# Patient Record
Sex: FEMALE | Race: WHITE | Employment: OTHER | URBAN - METROPOLITAN AREA
[De-identification: names, ages, dates, MRNs, and addresses within clinical notes are randomized per-mention and may not be internally consistent; named-entity substitution may affect disease eponyms.]

---

## 2021-08-31 ENCOUNTER — HOSPITAL ENCOUNTER (OUTPATIENT)
Dept: PHYSICAL THERAPY | Age: 76
Discharge: HOME | End: 2021-08-31
Payer: MEDICARE

## 2021-08-31 PROCEDURE — 97110 THERAPEUTIC EXERCISES: CPT

## 2021-08-31 PROCEDURE — 97161 PT EVAL LOW COMPLEX 20 MIN: CPT

## 2021-08-31 PROCEDURE — 97530 THERAPEUTIC ACTIVITIES: CPT

## 2021-08-31 NOTE — PROGRESS NOTES
PHYSICAL THERAPY - DAILY TREATMENT NOTE    Patient Name: Shonda Skelton        Date: 2021  : 1945   yes Patient  Verified  Visit #:   1   of   10  Insurance: Payor: Lindsey Johnson / Plan: [employer] / Product Type: Managed Care Medicare /      In time: 46 Out time: 4856   Total Treatment Time: 55     Medicare/BCBS Time Tracking (below)   Total Timed Codes (min):  23 1:1 Treatment Time:  55     TREATMENT AREA =  Low back pain [M54.5]    SUBJECTIVE  Pain Level (on 0 to 10 scale):  2  / 10   Medication Changes/New allergies or changes in medical history, any new surgeries or procedures?    no  If yes, update Summary List   Subjective Functional Status/Changes:  []  No changes reported   See Eval for subjective information and c/o. OBJECTIVE  8 min Therapeutic Exercise:  [x]  See flow sheet   Rationale:      increase ROM and increase strength to improve the patients ability to perform activities and decrease pain with movement. 15 min Therapeutic Activity: [x]  See flow sheet  Body part L-S  Patient was instructed in the importance of completing HEP as directed and in body mechanics and importance of following post-op lifting precautions provided by MD. Postural re-education; Education on the correlation of TA strength to L-S stability in addition to the use of logroll for bed mobility (if necessary); importance of cing 15-20min 3 x daily and avoiding bending/lifting/twisting at this time; instruction in the start of home walking program and monitoring steps/obtaining baseline     Rationale:    increase proprioception to  improve the patients ability to Tolerate basic ADLs and job-related tasks without pain.        Billed With/As:   [x] TE   [] TA   [] Neuro   [] Self Care Patient Education: [x] Review HEP    [] Progressed/Changed HEP based on:   [x] positioning   [x] body mechanics   [x] transfers   [] heat/ice application    [] other:      Other Objective/Functional Measures:    See Eval     Post Treatment Pain Level (on 0 to 10) scale:   1  / 10     ASSESSMENT  Assessment/Changes in Function:     See Eval     []  See Progress Note/Recertification   Patient will continue to benefit from skilled PT services to modify and progress therapeutic interventions to attain remaining goals. Progress toward goals / Updated goals:  Pt denied sharp pain or red flags with initial eval or therapeutic ex. See initial eval. HEP administered.       PLAN  [x]  Upgrade activities as tolerated yes Continue plan of care   []  Discharge due to :    []  Other:      Therapist: Ben Thomas PT    Date: 8/31/2021 Time: 12:46 PM     Future Appointments   Date Time Provider Marah Ackerman   9/14/2021 11:15 AM Eder Kendall [facility]   9/16/2021 11:45 AM Eder Kendall PTA [facility]   9/23/2021 10:30 AM [address] [facility] [address] 1 [facility]   9/28/2021 10:30 AM [address] [facility] [address] 1 [facility]   9/30/2021 10:30 AM [facility] PT [facility] 1 [facility]   10/5/2021 10:45 [facility]   10/7/2021 10:45 AM Tiffany Benítez, [facility] [address]

## 2021-08-31 NOTE — PROGRESS NOTES
[address] [facility] PHYSICAL THERAPY  [address] [place] [address] 51, [address] 201,[facility], [facility] - Phone: [phone]  Fax: [phone] / 9[facility]  Patient Name: Cecille Garcia : 1945   Medical   Diagnosis: Low back pain [M54.5] Treatment Diagnosis: Low back pain [M54.5]   Onset Date: 21     Referral Source: Lorrie More MD [facility]): 2021   Prior Hospitalization: See medical history Provider #: [id number]   Prior Level of Function: Indep; weightlifting; gardening; able to ambulate 3-5 miles    Comorbidities: L-S fusion; HTN; depression   Medications: Verified on Patient Summary List   The Plan of Care and following information is based on the information from the initial evaluation.   ========================================================================  Assessment / key information: Patient is a 76 y.o. female who presents to [facility] with a diagnosis of Low back pain [M54.5] s/p L-S fusion 21. Patient is her own historian. Living situation is as follows: alone in [address] [id number]. Patient presents with c/o right LE pain which radiates posterolaterally to distal to the knee, decreased mobility, decreased strength and decreased ability to sit for any length of time. Patient presents to therapy correctly donning L-S aspen brace and is knowledgeable in her post-op precautions. As per patient, she is restricted to lifting < 20lbs and dons her L-S at all times when OOB. She is icing her L-S daily and without any c/o LOB/falls. No red flags. Patient was discharged from the hospital after a 5 day stay and had 2 visits with OPPT at another location. Patient did not return to that facility and expresses interest in a comprehensive POC at this clinic to address limitations and return to PLOF.   Current Deficits include: pain, decreased mobility, decreased strength, and decreased postural awareness with resulting limitations in ADL's and in functional abilities. Patient will benefit from a comprehensive POC/HEP to address impairments and restore function in order to return to prior level of function and prevent secondary impairments. Impairments are as follows:   Pain: current pain level 2/10, pain level at worst 9/10, and pain level at best 2/10 TTP + R glute max/piriformis  Posture flattened L-S  Observations observed crossing legs while sitting and corrected with such; Incision clean, dry, intact; no visible dehiscence, erythema, or drainage noted. Gait WFLs with aspen brace  AROM/PROM (in degrees unless otherwise specified): All performed within protocol guidelines and within precautions and while sitting: L-S flexion with TA contraction and hip hinge technique 30, extension to neutral, side flexion deferred, isolated T-S rotation with blocked L-S and TA contraction 20deg   Strength B hip abd 4-/5 B hip extension deferred; all other LE myotomes grossly WNLs  Special Tests negative 90-90 test; poor ability to contract TA with manual cueing  Functional Tests 100% bridge  Functional Deficits include: decreased ability to return to gym based program with weightlifting; pain in the right LE with prolonged sitting. Patient's FOTO score was a 49/100 indicating decreased function.  Patient will benefit from a POC addressing such impairments and limitations in order to improve quality of life and return to PLOF.    ========================================================================  Eval Complexity: History: HIGH Complexity :3+ comorbidities / personal factors will impact the outcome/ POC Exam:MEDIUM Complexity : 3 Standardized tests and measures addressing body structure, function, activity limitation and / or participation in recreation  Presentation: LOW Complexity : Stable, uncomplicated  Clinical Decision Making:MEDIUM Complexity : FOTO score of 26-74Overall Complexity:LOW   Problem List: pain affecting function, decrease ROM, decrease strength, edema affecting function, impaired gait/ balance, decrease ADL/ functional abilitiies, decrease activity tolerance, decrease flexibility/ joint mobility and decrease transfer abilities   Treatment Plan may include any combination of the following: Therapeutic exercise, Therapeutic activities, Neuromuscular re-education, Physical agent/modality, Gait/balance training, Manual therapy, Aquatic therapy, Patient education, Self Care training, Functional mobility training, Home safety training and Stair training  Patient / Family readiness to learn indicated by: asking questions  Persons(s) to be included in education: patient (P)  Barriers to Learning/Limitations: None  Measures taken: A HEP was initiated to assist with POC in restoring function   Patient Goal (s): \"get back to normal and eliminate pain\"   Patient self reported health status: good  Rehabilitation Potential: excellent  Short Term Goals: To be accomplished in 4 treatments: 1. Goal: Patient will be independent and compliant with HEP in order to progress toward long term goals. Status at last note/certification: issued and reviewed  2. Goal: Patient will demonstrate the ability to contract the TA without manual cueing and maintain for 10sec hold in order to assist with L-S stabilization  Status at last note/certification: poor contraction    Long Term Goals: To be accomplished in 10 treatments: 1. Goal: Patient will improve FOTO assessment score to 57 pts in order to indicate improved functional abilities. Status at last note/certification: 49  2. Goal: Patient will report a +6 on GROC in order to aid in QOL         Status at last note/certification: NA  3. Goal: Patient will be able to demonstrate a correct hip hinge without LBP and with proper form in order to decrease pressure on the L-S with lifting and household chores         Status at last note/certification: NT  4. Goal: Patient will report being able to ambulate 3 miles without LBP in order to return to a walking program and assist with a return to function              Status at last note/certification: no baseline at this time    Frequency / Duration:     Patient to be seen  1-2  times per week for 10  treatments:  Patient / Caregiver education and instruction: exercises    Therapist Signature: Lionel Quinones PT Date: 0/09/9943   Certification Period: 90 days  8/31/21-11/28/21 Time: 12:29 PM   ========================================================================  I certify that the above Physical Therapy Services are being furnished while the patient is under my care. I agree with the treatment plan and certify that this therapy is necessary. Physician Signature:    _____  Date:     Time:______  Doris Colón MD  Please sign and return to [facility], [facility]. or you may fax the signed copy to [phone]. Thank you.

## 2021-09-14 ENCOUNTER — HOSPITAL ENCOUNTER (OUTPATIENT)
Dept: PHYSICAL THERAPY | Age: 76
Discharge: HOME | End: 2021-09-14
Payer: MEDICARE

## 2021-09-14 PROCEDURE — 97140 MANUAL THERAPY 1/> REGIONS: CPT

## 2021-09-14 PROCEDURE — 97110 THERAPEUTIC EXERCISES: CPT

## 2021-09-14 NOTE — PROGRESS NOTES
PHYSICAL THERAPY - DAILY TREATMENT NOTE    Patient Name: Michael Phillips        Date: 2021  : 1945   yes Patient  Verified  Visit #:   2   of   10  Insurance: Payor: Leticia Osei / Plan: [employer] / Product Type: Managed Care Medicare /      In time: 303 Out time:    Total Treatment Time: 55     Medicare/BCBS Time Tracking (below)   Total Timed Codes (min):  45 1:1 Treatment Time:  45     TREATMENT AREA =  Low back pain [M54.5]    SUBJECTIVE  Pain Level (on 0 to 10 scale):  2  / 10   Medication Changes/New allergies or changes in medical history, any new surgeries or procedures?    no  If yes, update Summary List   Subjective Functional Status/Changes:  []  No changes reported     Pt concerned that she is not as far along as she would like to be. States she is icing a couple of times a day. Says that she is doing longer walks (2-3 miles) once a day. Reports pain in her (R) LE when she walks - glute and shin. States the shin pain stops when she gets into a comfortable position - but never totally goes away. OBJECTIVE  Modalities Rationale:     decrease pain and increase tissue extensibility to improve patient's ability to perform pain free ADLs. min [] Estim, type/location:                                      []  att     []  unatt     []  w/US     []  w/ice    []  w/heat    min []  Mechanical Traction: type/lbs                   []  pro   []  sup   []  int   []  cont    []  before manual    []  after manual    min []  Ultrasound, settings/location:      min []  Iontophoresis w/ dexamethasone, location:                                               []  take home patch       []  in clinic   10 min []  Ice     [x]  Heat    location/position: Supine, lumbar.      min []  Vasopneumatic Device, press/temp:    If using vaso (only need to measure limb vaso being performed on)      pre-treatment girth :       post-treatment girth :       measured at (landmark location) :      min []  Other:    [x] Skin assessment post-treatment (if applicable):    [x]  intact    []  redness- no adverse reaction                  []redness  adverse reaction:      30 min Therapeutic Exercise:  [x]  See flow sheet   Rationale:      increase ROM, increase strength and improve coordination to improve the patients ability to perform pain free ADLs. 15 Min Manual Therapy: STM (B) lumbar paraspinals. Rationale:      decrease pain, increase ROM, increase tissue extensibility and decrease trigger points to improve patient's ability to perform pain free ADLs. The manual therapy interventions were performed at a separate and distinct time from the therapeutic activities interventions. Billed With/As:   [x] TE   [] TA   [] Neuro   [] Self Care Patient Education: [x] Review HEP    [] Progressed/Changed HEP based on:   [] positioning   [] body mechanics   [] transfers   [] heat/ice application    [] other:      Other Objective/Functional Measures: Therex per flow sheet. Post Treatment Pain Level (on 0 to 10) scale:   2  / 10     ASSESSMENT  Assessment/Changes in Function:     No exacerbation of symptoms with today's session. []  See Progress Note/Recertification   Patient will continue to benefit from skilled PT services to modify and progress therapeutic interventions, address functional mobility deficits, address ROM deficits, address strength deficits, analyze and address soft tissue restrictions, analyze and cue movement patterns, analyze and modify body mechanics/ergonomics and assess and modify postural abnormalities to attain remaining goals. Progress toward goals / Updated goals:    Initiated therex.       PLAN  [x]  Upgrade activities as tolerated yes Continue plan of care   []  Discharge due to :    []  Other:      Therapist: Sudie Runner, PTA    Date: 9/14/2021 Time: 11:43 AM     Future Appointments   Date Time Provider Marah Ackerman   9/16/2021 11:45 AM Unice Tryo B, PTA [facility]   9/23/2021 10:30 AM [facility] PT [facility] [address]   9/28/2021 10:30 AM [facility] PT [facility] [address]   9/30/2021 10:30 AM [facility] PT [facility] 1 [address]   10/5/2021 10:45 AM Hank Bourne [address]   10/7/2021 10:45 AM Jamee Peguero, PTA [facility]

## 2021-09-16 ENCOUNTER — HOSPITAL ENCOUNTER (OUTPATIENT)
Dept: PHYSICAL THERAPY | Age: 76
Discharge: HOME | End: 2021-09-16
Payer: MEDICARE

## 2021-09-16 PROCEDURE — 97140 MANUAL THERAPY 1/> REGIONS: CPT

## 2021-09-16 PROCEDURE — 97535 SELF CARE MNGMENT TRAINING: CPT

## 2021-09-16 PROCEDURE — 97110 THERAPEUTIC EXERCISES: CPT

## 2021-09-16 NOTE — PROGRESS NOTES
PHYSICAL THERAPY - DAILY TREATMENT NOTE    Patient Name: Paxton Esqueda        Date: 2021  : 1945   yes Patient  Verified  Visit #:   3   of   10  Insurance: Payor: Nishant Pace / Plan: [employer] / Product Type: Managed Care Medicare /      In time: 6144 Out time: 1   Total Treatment Time: 70     Medicare/BCBS Time Tracking (below)   Total Timed Codes (min):  70 1:1 Treatment Time:  60     TREATMENT AREA =  Low back pain [M54.5]    SUBJECTIVE  Pain Level (on 0 to 10 scale):  1  / 10   Medication Changes/New allergies or changes in medical history, any new surgeries or procedures?    no  If yes, update Summary List   Subjective Functional Status/Changes:  []  No changes reported     I was sore after the last visit, but I haven't had the hip pain since that visit. OBJECTIVE  40 min Therapeutic Exercise:  [x]  See flow sheet   Rationale:      increase ROM, increase strength and improve coordination to improve the patients ability to perform pain free ADLs. 20 min Manual Therapy: TPR (R) glute/piriformis. Rationale:      decrease pain, increase ROM, increase tissue extensibility and decrease trigger points to improve patient's ability to perform pain free ADLs. The manual therapy interventions were performed at a separate and distinct time from the therapeutic activities interventions. 10 Min Self Care: HEP review. Rationale:    increase ROM, increase strength, improve coordination and improve balance to improve the patients ability to perform pain free ADLs. Billed With/As:   [x] TE   [] TA   [] Neuro   [] Self Care Patient Education: [x] Review HEP    [] Progressed/Changed HEP based on:   [] positioning   [] body mechanics   [] transfers   [] heat/ice application    [] other:      Other Objective/Functional Measures: Therex per flow sheet.       Post Treatment Pain Level (on 0 to 10) scale:   1  / 10     ASSESSMENT  Assessment/Changes in Function: TTP (R) glute/piriformis. Demonstrating compliance with HEP. Pt has reduced the amount of time she spends walking daily at PTA request.      []  See Progress Note/Recertification   Patient will continue to benefit from skilled PT services to modify and progress therapeutic interventions, address functional mobility deficits, address ROM deficits, address strength deficits, analyze and address soft tissue restrictions, analyze and cue movement patterns, analyze and modify body mechanics/ergonomics and assess and modify postural abnormalities to attain remaining goals. Progress toward goals / Updated goals:    Perform pain free ADLs.        PLAN  [x]  Upgrade activities as tolerated yes Continue plan of care   []  Discharge due to :    []  Other:      Therapist: Africa Pichardo PTA    Date: 9/16/2021 Time: 3:12 PM     Future Appointments   Date Time Provider Marah Ackerman   9/21/2021 10:30 AM [address] [facility]   9/23/2021 10:30 AM [address]   9/28/2021 10:30 AM [address]   9/30/2021 10:30 AM [facility] PT [facility] [address]   10/5/2021 10:45 AM Rebecca Crook PTA [address]   10/7/2021 10:45 AM Saud Gallegos, PTA [facility]

## 2021-09-21 ENCOUNTER — APPOINTMENT (OUTPATIENT)
Dept: PHYSICAL THERAPY | Age: 76
End: 2021-09-21
Payer: MEDICARE

## 2021-09-22 ENCOUNTER — HOSPITAL ENCOUNTER (OUTPATIENT)
Dept: PHYSICAL THERAPY | Age: 76
Discharge: HOME | End: 2021-09-22
Payer: MEDICARE

## 2021-09-22 PROCEDURE — 97535 SELF CARE MNGMENT TRAINING: CPT

## 2021-09-22 PROCEDURE — 97140 MANUAL THERAPY 1/> REGIONS: CPT

## 2021-09-22 PROCEDURE — 97110 THERAPEUTIC EXERCISES: CPT

## 2021-09-22 NOTE — PROGRESS NOTES
PHYSICAL THERAPY - DAILY TREATMENT NOTE    Patient Name: Brit Cost        Date: 2021  : 1945   yes Patient  Verified  Visit #:      10  Insurance: Payor: Jasson Conrad / Plan: [employer] / Product Type: Managed Care Medicare /      In time: 130 Out time: 225   Total Treatment Time: 55     Medicare/BCBS Time Tracking (below)   Total Timed Codes (min):  55 1:1 Treatment Time:  55     TREATMENT AREA =  Low back pain [M54.5]    SUBJECTIVE  Pain Level (on 0 to 10 scale):  1  / 10   Medication Changes/New allergies or changes in medical history, any new surgeries or procedures?    no  If yes, update Summary List   Subjective Functional Status/Changes:  []  No changes reported     I haven't walked much today, but I'm not having anything (pain) in the legs today. OBJECTIVE  30 min Therapeutic Exercise:  [x]  See flow sheet   Rationale:      increase ROM, increase strength and improve coordination to improve the patients ability to perform pain free ADLs. 15 min Manual Therapy: TPR (R) glute/piriformis. Rationale:      decrease pain, increase ROM, increase tissue extensibility and decrease trigger points to improve patient's ability to perform pain free ADLs. The manual therapy interventions were performed at a separate and distinct time from the therapeutic activities interventions. 10 Min Self Care: HEP review. Rationale:    increase ROM, increase strength, improve coordination and improve balance to improve the patients ability to perform pain free ADLs. Billed With/As:   [x] TE   [] TA   [] Neuro   [] Self Care Patient Education: [x] Review HEP    [] Progressed/Changed HEP based on:   [] positioning   [] body mechanics   [] transfers   [] heat/ice application    [] other:      Other Objective/Functional Measures: Therex per flow sheet.       Post Treatment Pain Level (on 0 to 10) scale:   1  / 10     ASSESSMENT  Assessment/Changes in Function: No exacerbation of symptoms with today's session. []  See Progress Note/Recertification   Patient will continue to benefit from skilled PT services to modify and progress therapeutic interventions, address functional mobility deficits, address ROM deficits, address strength deficits, analyze and address soft tissue restrictions, analyze and cue movement patterns, analyze and modify body mechanics/ergonomics and assess and modify postural abnormalities to attain remaining goals. Progress toward goals / Updated goals:    No change in progress toward LTG's with today's session.       PLAN  []  Upgrade activities as tolerated yes Continue plan of care   []  Discharge due to :    []  Other:      Therapist: Imtiaz Koroma PTA    Date: 9/22/2021 Time: 1:36 PM     Future Appointments   Date Time Provider Marah Ackerman   9/24/2021  2:15 PM Chestine Running [address] [facility]   9/28/2021 12:00 PM Chestine Running [address] [facility]   9/30/2021 12:30 PM Chestine Running [address] [facility]   10/5/2021  1:15 PM Chestine Running [address] [facility]   10/7/2021 12:30 PM Roberta Vines PTA [address]

## 2021-09-23 ENCOUNTER — APPOINTMENT (OUTPATIENT)
Dept: PHYSICAL THERAPY | Age: 76
End: 2021-09-23
Payer: MEDICARE

## 2021-09-24 ENCOUNTER — HOSPITAL ENCOUNTER (OUTPATIENT)
Dept: PHYSICAL THERAPY | Age: 76
Discharge: HOME | End: 2021-09-24
Payer: MEDICARE

## 2021-09-24 PROCEDURE — 97110 THERAPEUTIC EXERCISES: CPT

## 2021-09-24 PROCEDURE — 97140 MANUAL THERAPY 1/> REGIONS: CPT

## 2021-09-28 ENCOUNTER — APPOINTMENT (OUTPATIENT)
Dept: PHYSICAL THERAPY | Age: 76
End: 2021-09-28
Payer: MEDICARE

## 2021-09-30 ENCOUNTER — APPOINTMENT (OUTPATIENT)
Dept: PHYSICAL THERAPY | Age: 76
End: 2021-09-30
Payer: MEDICARE

## 2021-09-30 ENCOUNTER — HOSPITAL ENCOUNTER (OUTPATIENT)
Dept: PHYSICAL THERAPY | Age: 76
Discharge: HOME | End: 2021-09-30
Payer: MEDICARE

## 2021-09-30 PROCEDURE — 97110 THERAPEUTIC EXERCISES: CPT

## 2021-09-30 PROCEDURE — 97140 MANUAL THERAPY 1/> REGIONS: CPT

## 2021-09-30 NOTE — PROGRESS NOTES
PHYSICAL THERAPY - DAILY TREATMENT NOTE    Patient Name: Sam Gaxiola        Date: 2021  : 1945   yes Patient  Verified  Visit #:      of   10  Insurance: Payor: Fabrizio Sawant / Plan: [employer] / Product Type: Managed Care Medicare /      In time: 8427 Out time: 130   Total Treatment Time: 60     Medicare/[facility] Time Tracking (below)   Total Timed Codes (min):  60 1:1 Treatment Time:  60     TREATMENT AREA =  Low back pain [M54.5]    SUBJECTIVE  Pain Level (on 0 to 10 scale):  1  / 10   Medication Changes/New allergies or changes in medical history, any new surgeries or procedures?    no  If yes, update Summary List   Subjective Functional Status/Changes:  []  No changes reported     No new complaints. OBJECTIVE  30 min Therapeutic Exercise:  [x]  See flow sheet   Rationale:      increase ROM, increase strength, improve coordination and improve balance to improve the patients ability to perform pain free ADLs. 30 Min Manual Therapy: STM/DTM (B) lumbar paraspinals. Rationale:      decrease pain, increase ROM, increase tissue extensibility and decrease trigger points to improve patient's ability to perform pain free ADLs. The manual therapy interventions were performed at a separate and distinct time from the therapeutic activities interventions. Billed With/As:   [x] TE   [] TA   [] Neuro   [] Self Care Patient Education: [x] Review HEP    [] Progressed/Changed HEP based on:   [] positioning   [] body mechanics   [] transfers   [] heat/ice application    [] other:      Other Objective/Functional Measures: Therex per flow sheet. Post Treatment Pain Level (on 0 to 10) scale:   0  / 10     ASSESSMENT  Assessment/Changes in Function:     No exacerbation of symptoms with today's session.       []  See Progress Note/Recertification   Patient will continue to benefit from skilled PT services to modify and progress therapeutic interventions, address functional mobility deficits, address ROM deficits, address strength deficits, analyze and address soft tissue restrictions, analyze and cue movement patterns, analyze and modify body mechanics/ergonomics and assess and modify postural abnormalities to attain remaining goals. Progress toward goals / Updated goals:    No change in progress toward LTG's with today's session.       PLAN  [x]  Upgrade activities as tolerated yes Continue plan of care   []  Discharge due to :    []  Other:      Therapist: George Mike PTA    Date: 9/30/2021 Time: 12:32 PM     Future Appointments   Date Time Provider Marah Ackerman   10/5/2021  1:15 PM [address]   10/7/2021 12:30 PM MERE [address]

## 2021-09-30 NOTE — PROGRESS NOTES
PHYSICAL THERAPY - DAILY TREATMENT NOTE    Patient Name: John Myrick        Date: 2021  : 1945   yes Patient  Verified  Visit #:      10  Insurance: Payor: Lexa Hidden / [facility] / Product Type: Managed Care Medicare /      In time: 130 Out time: 235   Total Treatment Time: 65     Medicare/BCBS Time Tracking (below)   Total Timed Codes (min):  65 1:1 Treatment Time:  65     TREATMENT AREA =  Low back pain [M54.5]    SUBJECTIVE  Pain Level (on 0 to 10 scale):  3  / 10   Medication Changes/New allergies or changes in medical history, any new surgeries or procedures?    no  If yes, update Summary List   Subjective Functional Status/Changes:  []  No changes reported     I've been a little sore lately. The walking is going okay. OBJECTIVE  40 min Therapeutic Exercise:  [x]  See flow sheet   Rationale:      increase ROM, increase strength, improve coordination and improve balance to improve the patients ability to perform pain free ADLs. 20 Min Manual Therapy: TPR (R) glute/piriformis. Rationale:      decrease pain, increase ROM, increase tissue extensibility and decrease trigger points to improve patient's ability to perform pain free ADLs. The manual therapy interventions were performed at a separate and distinct time from the therapeutic activities interventions. Billed With/As:   [x] TE   [] TA   [] Neuro   [] Self Care Patient Education: [x] Review HEP    [] Progressed/Changed HEP based on:   [] positioning   [] body mechanics   [] transfers   [] heat/ice application    [] other:      Other Objective/Functional Measures: Therex per flow sheet. Post Treatment Pain Level (on 0 to 10) scale:   1  / 10     ASSESSMENT  Assessment/Changes in Function:     No exacerbation of symptoms with today's session.       []  See Progress Note/Recertification   Patient will continue to benefit from skilled PT services to modify and progress therapeutic interventions, address functional mobility deficits, address ROM deficits, address strength deficits, analyze and address soft tissue restrictions, analyze and cue movement patterns, analyze and modify body mechanics/ergonomics and assess and modify postural abnormalities to attain remaining goals. Progress toward goals / Updated goals:    No change in progress toward LTG's with today's session.       PLAN  [x]  Upgrade activities as tolerated yes Continue plan of care   []  Discharge due to :    []  Other:      Therapist: Arianna Simms PTA    Date: 9/30/2021 Time: 12:33 PM     Future Appointments   Date Time Provider Marah Ackerman   10/5/2021  1:15 PM Thomas [facility]   10/7/2021 12:30 PM [facility]

## 2021-10-05 ENCOUNTER — HOSPITAL ENCOUNTER (OUTPATIENT)
Dept: PHYSICAL THERAPY | Age: 76
Discharge: HOME | End: 2021-10-05
Payer: MEDICARE

## 2021-10-05 ENCOUNTER — APPOINTMENT (OUTPATIENT)
Dept: PHYSICAL THERAPY | Age: 76
End: 2021-10-05
Payer: MEDICARE

## 2021-10-05 PROCEDURE — 97140 MANUAL THERAPY 1/> REGIONS: CPT

## 2021-10-05 PROCEDURE — 97110 THERAPEUTIC EXERCISES: CPT

## 2021-10-05 NOTE — PROGRESS NOTES
PHYSICAL THERAPY - DAILY TREATMENT NOTE    Patient Name: Tiny Hill        Date: 10/5/2021  : 1945   yes Patient  Verified  Visit #:   7   of   10  Insurance: Payor: Shira Marsjose / Plan: [employer] / Product Type: Managed Care Medicare /      In time: 130 Out time: ***   Total Treatment Time: ***     Medicare/BCBS Time Tracking (below)   Total Timed Codes (min):  *** 1:1 Treatment Time:  ***     TREATMENT AREA =  Low back pain [M54.50]    SUBJECTIVE  Pain Level (on 0 to 10 scale):  3  / 10   Medication Changes/New allergies or changes in medical history, any new surgeries or procedures?    no  If yes, update Summary List   Subjective Functional Status/Changes:  []  No changes reported     Pt reporting continued pain in the (R) sided glute and side of the hip. Pt has not noticed pain in the side of the (R) lower leg lately. Pt reporting recent max pain of 6/10, 25% improvement in symptoms since beginning PT. Pt states she is walking about 2 miles per day.          OBJECTIVE  Modalities Rationale:     {[facility] INMOTION MODALITIES:02006} to improve patient's ability to ***   min [] Estim, type/location:                                      []  att     []  unatt     []  w/US     []  w/ice    []  w/heat    min []  Mechanical Traction: type/lbs                   []  pro   []  sup   []  int   []  cont    []  before manual    []  after manual    min []  Ultrasound, settings/location:      min []  Iontophoresis w/ dexamethasone, location:                                               []  take home patch       []  in clinic    min []  Ice     []  Heat    location/position:     min []  Vasopneumatic Device, press/temp:    If using vaso (only need to measure limb vaso being performed on)      pre-treatment girth :       post-treatment girth :       measured at (landmark location) :      min []  Other:    [] Skin assessment post-treatment (if applicable):    []  intact    []  redness- no adverse reaction                  []redness  adverse reaction:      *** min Therapeutic Exercise:  [x]  See flow sheet   Rationale:      increase ROM, increase strength, improve coordination and improve balance to improve the patients ability to perform pain free ADLs. *** Min Manual Therapy: STM/DTM (B) lumbar paraspinals. Rationale:      decrease pain, increase ROM, increase tissue extensibility and decrease trigger points to improve patient's ability to perform pain free ADLs. The manual therapy interventions were performed at a separate and distinct time from the therapeutic activities interventions. Billed With/As:   [x] TE   [] TA   [] Neuro   [] Self Care Patient Education: [x] Review HEP    [] Progressed/Changed HEP based on:   [] positioning   [] body mechanics   [] transfers   [] heat/ice application    [] other:      Other Objective/Functional Measures: 1. Goal: Patient will improve FOTO assessment score to 57 pts in order to indicate improved functional abilities. Status at last note/certification: 49  2. Goal: Patient will report a +6 on GROC in order to aid in QOL         Status at last note/certification: NA  3. Kirsty Nails will be able to demonstrate a correct hip hinge without LBP and with proper form in order to decrease pressure on the L-S with lifting and household chores         Status at last note/certification: NT  4. Kirsty Nails will report being able to ambulate 3 miles without LBP in order to return to a walking program and assist with a return to function              Status at last note/certification: no baseline at this time    ***     Post Treatment Pain Level (on 0 to 10) scale:   ***  / 10     ASSESSMENT  Assessment/Changes in Function:     ***     []  See Progress Note/Recertification   Patient will continue to benefit from skilled PT services to modify and progress therapeutic interventions, address functional mobility deficits, address ROM deficits, address strength deficits, analyze and address soft tissue restrictions, analyze and cue movement patterns, analyze and modify body mechanics/ergonomics and assess and modify postural abnormalities to attain remaining goals.    Progress toward goals / Updated goals:    ***     PLAN  [x]  Upgrade activities as tolerated yes Continue plan of care   []  Discharge due to :    []  Other:      Therapist: Elo Garcia PTA    Date: 10/5/2021 Time: 1:37 PM     Future Appointments   Date Time Provider Marah Ackerman   10/7/2021 12:30 PM Katharine Pickard [id number]

## 2021-10-07 ENCOUNTER — APPOINTMENT (OUTPATIENT)
Dept: PHYSICAL THERAPY | Age: 76
End: 2021-10-07
Payer: MEDICARE

## 2021-10-12 ENCOUNTER — APPOINTMENT (OUTPATIENT)
Dept: PHYSICAL THERAPY | Age: 76
End: 2021-10-12
Payer: MEDICARE

## 2021-10-15 ENCOUNTER — HOSPITAL ENCOUNTER (OUTPATIENT)
Dept: PHYSICAL THERAPY | Age: 76
End: 2021-10-15
Payer: MEDICARE

## 2021-10-19 ENCOUNTER — APPOINTMENT (OUTPATIENT)
Dept: PHYSICAL THERAPY | Age: 76
End: 2021-10-19
Payer: MEDICARE

## 2021-10-20 NOTE — PROGRESS NOTES
PHYSICAL THERAPY - DAILY TREATMENT NOTE    Patient Name: Claudene Boots        Date: 10/20/2021  : 1945   yes Patient  Verified  Visit #:   7   of   10  Insurance: Payor: Patt Oscar / Plan: [employer] / Product Type: Managed Care Medicare /      In time: 1:30 Out time: 2:40   Total Treatment Time: 70     Medicare/[facility] Time Tracking (below)   Total Timed Codes (min):  NA 1:1 Treatment Time:  NA       ** Unable to properly document on this date of service, treating PTA did not complete note and has since left the company, therefore will not bill for this session **    TREATMENT AREA =  Other low back pain [M54.59]    SUBJECTIVE  Pain Level (on 0 to 10 scale):  3  / 10   Medication Changes/New allergies or changes in medical history, any new surgeries or procedures?    no  If yes, update Summary List   Subjective Functional Status/Changes:  []  No changes reported     Pt reporting continued pain in the (R) sided glute and side of the hip. Pt has not noticed pain in the side of the (R) lower leg lately. Pt reporting recent max pain of 6/10, 25% improvement in symptoms since beginning PT. Pt states she is walking about 2 miles per day.             OBJECTIVE      NA min Therapeutic Exercise:  [x]  See flow sheet       NA min Therapeutic Activity: [x]  See flow sheet       NA min Neuromuscular Re-ed: [x]  See flow sheet         NA min Self Care:        Billed With/As:   [] TE   [] TA   [] Neuro   [] Self Care Patient Education: [x] Review HEP    [] Progressed/Changed HEP based on:   [] positioning   [] body mechanics   [] transfers   [] heat/ice application    [] other:      PLAN  []  Upgrade activities as tolerated yes Continue plan of care   []  Discharge due to :    []  Other:      ** Unable to properly document on this date of service, treating PTA did not complete note and has since left the company, therefore will not bill for this session **      Therapist: ** Ravi Lan PT, DPT, OCS, CSCS; completing note for Reyes Flnikkie SEVERINO, who left company unexpectedly **    Date: 10/20/2021 Time: 9:39 AM     Future Appointments   Date Time Provider Marah Ackerman   11/3/2021 11:30 AM Viridiana Landrum [facility]   11/10/2021 11:30 AM Juanis Yarbrough PTA [facility]   11/17/2021 11:30 AM Juanis Yarbrough PTA [facility]   11/22/2021 11:30 AM Mundo Dietz PTA [facility] [address]

## 2021-10-21 ENCOUNTER — APPOINTMENT (OUTPATIENT)
Dept: PHYSICAL THERAPY | Age: 76
End: 2021-10-21
Payer: MEDICARE

## 2021-10-25 ENCOUNTER — HOSPITAL ENCOUNTER (OUTPATIENT)
Dept: PHYSICAL THERAPY | Age: 76
Discharge: HOME | End: 2021-10-25
Payer: MEDICARE

## 2021-10-25 PROCEDURE — 97110 THERAPEUTIC EXERCISES: CPT

## 2021-10-25 PROCEDURE — 97535 SELF CARE MNGMENT TRAINING: CPT

## 2021-10-25 NOTE — PROGRESS NOTES
PHYSICAL THERAPY - DAILY TREATMENT NOTE    Patient Name: Flynn Guzmán        Date: 10/25/2021  : 1945   yes Patient  Verified  Visit #:   8   of   10  Insurance: Payor: Holland Rodriguez / Plan: [employer] / Product Type: Managed Care Medicare /      In time: 9209 am Out time: 4563 am   Total Treatment Time: 38     Medicare/BCBS Time Tracking (below)   Total Timed Codes (min):  38 1:1 Treatment Time:  38     TREATMENT AREA =  Other low back pain [M54.59]    SUBJECTIVE  Pain Level (on 0 to 10 scale):  3 / 10   Medication Changes/New allergies or changes in medical history, any new surgeries or procedures?    no  If yes, update Summary List   Subjective Functional Status/Changes:  []  No changes reported     See Progress Note       OBJECTIVE  15 min Therapeutic Exercise:  [x]  See flow sheet   Rationale:      increase ROM and increase strength to improve the patient's ability to perform functional tasks and ADLs     23 min Self Care: See pt education   Rationale:    Pt education to improve the patient's ability to adhere to PT POC    Billed With/As:  [] TE  [] TA  [] Neuro  [x] Self Care Patient Education: [x] Review HEP    [] Progressed/Changed HEP based on:   [] positioning   [] body mechanics   [] transfers   [] heat/ice application    [x] other: PT progress, POC     Other Objective/Functional Measures:    See Progress Note     Post Treatment Pain Level (on 0 to 10) scale:   2-3  / 10     ASSESSMENT  Assessment/Changes in Function:     See Progress Note     [x]  See Progress Note/Recertification   Patient will continue to benefit from skilled PT services to modify and progress therapeutic interventions, address functional mobility deficits, address ROM deficits, address strength deficits, analyze and address soft tissue restrictions, analyze and cue movement patterns, analyze and modify body mechanics/ergonomics, assess and modify postural abnormalities, address imbalance/dizziness and instruct in home and community integration to attain remaining goals.    Progress toward goals / Updated goals:    See Progress Note     PLAN  [x]  Upgrade activities as tolerated yes Continue plan of care   []  Discharge due to :    []  Other:      Therapist: Rachid Hastings, PT    Date: 10/25/2021 Time: 10:30 AM     Future Appointments   Date Time Provider Marah Ackerman   11/3/2021 11:30 AM Oc Allen, [facility] [address]   11/10/2021 11:30 AM Oc Allen PTA [facility]   11/17/2021 11:30 AM Oc Allen, PTA [facility] [address]   11/22/2021 11:30 AM Oc Allen, PTA [facility] [address]

## 2021-10-25 NOTE — PROGRESS NOTES
[facility] PHYSICAL THERAPY   [facility] [address],[place] Heard, [address] \[facility] - Phone: [phone]  Fax: [phone] THERAPY          Patient Name: Mehnaz Zarate : 1945   Treatment/Medical Diagnosis: Other low back pain [M54.59]   Onset Date: 21    Referral Source: Ramiro Shea MD [facility] [place]): 21   Prior Hospitalization: See Medical History Provider #: [id number]   Prior Level of Function: Indep; weightlifting; gardening; able to ambulate 3-5 miles   Comorbidities: L-S fusion; HTN; depression   Medications: Verified on Patient Summary List   Visits from [facility]: 8 Missed Visits: 1     SUMMARY OF TREATMENT  Skilled PT services have consisted of: ther ex, manual therapy, pt education, modalities PRN, HEP  CURRENT STATUS  Patient has made good progress with PT interventions s/p lumbar fusion 21. Patient reports ~50% overall improvement since beginning PT. In the last 2 weeks, pain has ranged between 2-6/10. Notices most improvement with adherence to post-op precautions and home exercises. She has been able to resume light gardening with brace donned. Significant functional improvement denoted with 7 point increase on FOTO and +4 on GROC. Goal/Measure of Progress Goal Met? 1. Patient will be independent and compliant with HEP in order to progress toward long term goals. Status at Last Eval: New goal Current Status: Pt reports performing 3x/weekly Progressing   2. Patient will demonstrate the ability to contract the TA without manual cueing and maintain for 10sec hold in order to assist with L-S stabilization. Status at Last Eval: Poor contraction noted at IE Current Status: Pt able to contract the TA without cueing while supine for approx 5 seconds Progressing   3. Patient will improve FOTO assessment score to 57 pts in order to indicate improved functional abilities.    Status at Last Eval: 49 Current Status: 56 Progressing   4. Patient will report a +6 on GROC in order to aid in QOL. Status at Last Eval: New goal Current Status: +4 Progressing   5. Patient will be able to demonstrate a correct hip hinge without LBP and with proper form in order to decrease pressure on the L-S with lifting and household chores. Status at Last Eval: Able to perform with verbal cue for TA contraction to 30 deg lumbar flex Current Status: Able to perform with verbal cue for TA contraction to 30 deg lumbar flex no   6. Patient will report being able to ambulate 3 miles without LBP in order to return to a walking program and assist with a return to function. Status at Last Eval: Pt reports walking 1/2 block Current Status: Pt reports walking a full block Progressing     Key Functional Changes/Progress: See above  Problem List: pain affecting function, decrease ROM, decrease strength, edema affecting function, impaired gait/ balance, decrease ADL/ functional abilitiies, decrease activity tolerance, decrease flexibility/ joint mobility and decrease transfer abilities   Treatment Plan may include any combination of the following: Therapeutic exercise, Therapeutic activities, Neuromuscular re-education, Physical agent/modality, Gait/balance training, Manual therapy, Patient education, Self Care training, Functional mobility training, Home safety training and Stair training  Patient Goal(s) has/have been updated and include(s):      Goals for this certification period include and are to be achieved in  4  weeks:  1) Patient will be independent and compliant with HEP in order to progress toward long term goals. 2) Patient will demonstrate the ability to contract the TA without manual cueing and maintain for 10sec hold in order to assist with L-S stabilization. 3) Patient will improve FOTO assessment score to 57 pts in order to indicate improved functional abilities.   4) Patient will report a +6 on GROC in order to aid in QOL. 5) Patient will be able to demonstrate a correct hip hinge without LBP and with proper form in order to decrease pressure on the L-S with lifting and household chores  6) Patient will report being able to ambulate 3 miles without LBP in order to return to a walking program and assist with a return to function. Frequency / Duration:   Patient to be seen   1-2   times per week for   4    weeks:  Assessments/Recommendations: Continue seeing pt 1-2x/week x4 more weeks. If you have any questions/comments please contact us directly at [phone]. Thank you for allowing us to assist in the care of your patient. Therapist Signature: Parvez Juarez PT Date: 82/66/8071   Certification Period:  Reporting Period: 10/25/21 to 1/23/22 8/31/21 to 10/25/2021 Time: 10:30 AM   NOTE TO PHYSICIAN:  PLEASE COMPLETE THE ORDERS BELOW AND FAX TO   [facility]: [phone]  If you are unable to process this request in 24 hours please contact our office: [phone]    ___ I have read the above report and request that my patient continue as recommended.   ___ I have read the above report and request that my patient continue therapy with the following changes/special instructions: ________________________________________________   ___ I have read the above report and request that my patient be discharged from therapy. I certify that the above Physical Therapy Services are being furnished while the patient is under my care. I agree with the treatment plan and certify that this therapy is necessary.     Physician Signature:        Date:       Time:                                        Jacqueline Downing MD

## 2021-10-26 ENCOUNTER — APPOINTMENT (OUTPATIENT)
Dept: PHYSICAL THERAPY | Age: 76
End: 2021-10-26
Payer: MEDICARE

## 2021-10-26 ENCOUNTER — HOSPITAL ENCOUNTER (OUTPATIENT)
Dept: PHYSICAL THERAPY | Age: 76
End: 2021-10-26

## 2021-11-03 ENCOUNTER — APPOINTMENT (OUTPATIENT)
Dept: PHYSICAL THERAPY | Age: 76
End: 2021-11-03
Payer: MEDICARE

## 2021-11-05 ENCOUNTER — HOSPITAL ENCOUNTER (OUTPATIENT)
Dept: PHYSICAL THERAPY | Age: 76
Discharge: HOME | End: 2021-11-05
Payer: MEDICARE

## 2021-11-05 PROCEDURE — 97014 ELECTRIC STIMULATION THERAPY: CPT

## 2021-11-05 PROCEDURE — 97110 THERAPEUTIC EXERCISES: CPT

## 2021-11-05 PROCEDURE — 97140 MANUAL THERAPY 1/> REGIONS: CPT

## 2021-11-05 NOTE — PROGRESS NOTES
PHYSICAL THERAPY - DAILY TREATMENT NOTE    Patient Name: Jalen Hinkle        Date: 2021  : 1945   yes Patient  Verified  Visit #:     Insurance: Payor: Meryl Gonzales / Plan: [employer] / Product Type: Managed Care Medicare /      In time: 250 Out time: 1055   Total Treatment Time: 60     Medicare/BCBS Time Tracking (below)   Total Timed Codes (min):  60 1:1 Treatment Time:  60     TREATMENT AREA =  Other low back pain [M54.59]    SUBJECTIVE  Pain Level (on 0 to 10 scale):  2  / 10   Medication Changes/New allergies or changes in medical history, any new surgeries or procedures?    no  If yes, update Summary List   Subjective Functional Status/Changes:  []  No changes reported     I left my exercise at my sons. I had some problems and pain with walking on vacation but pushed through it. OBJECTIVE  Modalities Rationale:     decrease inflammation and decrease pain to improve patient's ability to perform functional mobility activities and general ADLs with decrease c/o symptoms.   10 min [x] Estim, type/location: (R) SIJ and glute premod                                     []  att     [x]  unatt     []  w/US     [x]  w/ice    []  w/heat    min []  Mechanical Traction: type/lbs                   []  pro   []  sup   []  int   []  cont    []  before manual    []  after manual    min []  Ultrasound, settings/location:      min []  Iontophoresis w/ dexamethasone, location:                                               []  take home patch       []  in clinic    min []  Ice     []  Heat    location/position:     min []  Vasopneumatic Device, press/temp:    If using vaso (only need to measure limb vaso being performed on)      pre-treatment girth :       post-treatment girth :       measured at (landmark location) :      min []  Other:    [x] Skin assessment post-treatment (if applicable):    [x]  intact    []  redness- no adverse reaction                  []redness  adverse reaction:      25 min Therapeutic Exercise:  [x]  See flow sheet   Rationale:      increase ROM, increase strength, improve coordination and improve balance to improve the patients ability to perform functional mobility activities and general ADLs with decrease c/o symptoms. 25 min Manual Therapy: Prone with 2 pillows: DTM L/S and lower T/s paraspinals, DTM (B) SIJ sulcus, (R) upper glute and piriformis, ER ROM/Stretch. Rationale:      decrease pain, increase ROM and increase tissue extensibility to improve patient's ability to perform functional mobility activities and general ADLs with decrease c/o symptoms. The manual therapy interventions were performed at a separate and distinct time from the therapeutic activities interventions. Billed With/As:   [x] TE   [] TA   [] Neuro   [] Self Care Patient Education: [x] Review HEP    [] Progressed/Changed HEP based on:   [] positioning   [] body mechanics   [] transfers   [] heat/ice application    [] other:      Other Objective/Functional Measures:    No change in functional measurements noted toady     Post Treatment Pain Level (on 0 to 10) scale:   2  / 10     ASSESSMENT  Assessment/Changes in Function:     vcing to perform all therx with good form and body mechanics. []  See Progress Note/Recertification   Patient will continue to benefit from skilled PT services to modify and progress therapeutic interventions, address functional mobility deficits, address ROM deficits, address strength deficits, analyze and address soft tissue restrictions and analyze and cue movement patterns to attain remaining goals. Progress toward goals / Updated goals:    · Goals for this certification period include and are to be achieved in  4  weeks:  1) Patient will be independent and compliant with HEP in order to progress toward long term goals.   2) Patient will demonstrate the ability to contract the TA without manual cueing and maintain for 10sec hold in order to assist with L-S stabilization. 3) Patient will improve FOTO assessment score to 57 pts in order to indicate improved functional abilities. 4) Patient will report a +6 on GROC in order to aid in QOL. 5) Patient will be able to demonstrate a correct hip hinge without LBP and with proper form in order to decrease pressure on the L-S with lifting and household chores  6) Patient will report being able to ambulate 3 miles without LBP in order to return to a walking program and assist with a return to function. No change toward new goals today.      PLAN  []  Upgrade activities as tolerated yes Continue plan of care   []  Discharge due to :    []  Other:      Therapist: Lin La PTA    Date: 11/5/2021 Time: 6:30 AM     Future Appointments   Date Time Provider Marah Ackerman   11/5/2021 10:00 AM [facility]   11/10/2021 11:30 AM [facility]   11/17/2021 11:30 AM [facility]   11/22/2021 11:30 AM [facility]

## 2021-11-10 ENCOUNTER — HOSPITAL ENCOUNTER (OUTPATIENT)
Dept: PHYSICAL THERAPY | Age: 76
Discharge: HOME | End: 2021-11-10
Payer: MEDICARE

## 2021-11-10 PROCEDURE — 97140 MANUAL THERAPY 1/> REGIONS: CPT

## 2021-11-10 PROCEDURE — 97014 ELECTRIC STIMULATION THERAPY: CPT

## 2021-11-10 PROCEDURE — 97110 THERAPEUTIC EXERCISES: CPT

## 2021-11-10 NOTE — PROGRESS NOTES
PHYSICAL THERAPY - DAILY TREATMENT NOTE    Patient Name: Darryle Lint        Date: 11/10/2021  : 1945   yes Patient  Verified  Visit #:   10   of   18  Insurance: Payor: Alayna Jefferson / Plan: [employer] / Product Type: Managed Care Medicare /      In time:  Out time: 115   Total Treatment Time: 50     Medicare/BCBS Time Tracking (below)   Total Timed Codes (min):  50 1:1 Treatment Time:  50     TREATMENT AREA =  Other low back pain [M54.59]    SUBJECTIVE  Pain Level (on 0 to 10 scale):  3  / 10   Medication Changes/New allergies or changes in medical history, any new surgeries or procedures?    no  If yes, update Summary List   Subjective Functional Status/Changes:  []  No changes reported     I felt really good after last session and much more relaxed in my back. Walking is my worse though. OBJECTIVE  Modalities Rationale:     decrease inflammation and decrease pain to improve patient's ability to perform functional mobility activities and general ADLs with decrease c/o symptoms.    10 min [x] Estim, type/location: (R) SIJ and glute premod                                         []  att     [x]  unatt     []  w/US     [x]  w/ice    []  w/heat    min []  Mechanical Traction: type/lbs                   []  pro   []  sup   []  int   []  cont    []  before manual    []  after manual    min []  Ultrasound, settings/location:      min []  Iontophoresis w/ dexamethasone, location:                                               []  take home patch       []  in clinic    min []  Ice     []  Heat    location/position:     min []  Vasopneumatic Device, press/temp:    If using vaso (only need to measure limb vaso being performed on)      pre-treatment girth :       post-treatment girth :       measured at (landmark location) :      min []  Other:    [x] Skin assessment post-treatment (if applicable):    [x]  intact    []  redness- no adverse reaction                  []redness  adverse reaction:      25 min Therapeutic Exercise:  [x]  See flow sheet   Rationale:      increase ROM, increase strength, improve coordination and improve balance to improve the patients ability to perform functional mobility activities and general ADLs with decrease c/o symptoms. 15 min Manual Therapy: Prone with 2 pillows: DTM L/S and lower T/s paraspinals, DTM (B) SIJ sulcus, (R) upper glute and piriformis, ER ROM/Stretch. Rationale:      decrease pain, increase ROM and increase tissue extensibility to improve patient's ability to perform functional mobility activities and general ADLs with decrease c/o symptoms. The manual therapy interventions were performed at a separate and distinct time from the therapeutic activities interventions. Billed With/As:   [x] TE   [] TA   [] Neuro   [] Self Care Patient Education: [x] Review HEP    [] Progressed/Changed HEP based on:   [] positioning   [] body mechanics   [] transfers   [] heat/ice application    [] other:      Other Objective/Functional Measures:    =pelvis    Continue with therx for core/pelvis strengthening and stability     Post Treatment Pain Level (on 0 to 10) scale:   0  / 10     ASSESSMENT  Assessment/Changes in Function:     No new c/o. Continues to have difficulty and increase pain with prolonged standing and walking. Patient was able to walk without pain after therx and manual was performed. []  See Progress Note/Recertification   Patient will continue to benefit from skilled PT services to modify and progress therapeutic interventions, address functional mobility deficits, address ROM deficits, address strength deficits, analyze and address soft tissue restrictions and analyze and cue movement patterns to attain remaining goals.    Progress toward goals / Updated goals:    · Goals for this certification period include and are to be achieved in  4  weeks:  1) Patient will be independent and compliant with HEP in order to progress toward long term goals. 2) Patient will demonstrate the ability to contract the TA without manual cueing and maintain for 10sec hold in order to assist with L-S stabilization. 3) Patient will improve FOTO assessment score to 57 pts in order to indicate improved functional abilities. 4) Patient will report a +6 on GROC in order to aid in QOL. 5) Patient will be able to demonstrate a correct hip hinge without LBP and with proper form in order to decrease pressure on the L-S with lifting and household chores  6) Patient will report being able to ambulate 3 miles without LBP in order to return to a walking program and assist with a return to function.      PLAN  []  Upgrade activities as tolerated yes Continue plan of care   []  Discharge due to :    []  Other:      Therapist: Nona Srinivasan PTA    Date: 11/10/2021 Time: 6:36 AM     Future Appointments   Date Time Provider Mraah Ackerman   11/10/2021 11:30 AM Yogi Pickard 3914   11/17/2021 11:30 AM MERE Castro 3914   11/22/2021 11:30 AM MERE [address] [address]

## 2021-11-17 ENCOUNTER — HOSPITAL ENCOUNTER (OUTPATIENT)
Dept: PHYSICAL THERAPY | Age: 76
Discharge: HOME | End: 2021-11-17
Payer: MEDICARE

## 2021-11-17 PROCEDURE — 97140 MANUAL THERAPY 1/> REGIONS: CPT

## 2021-11-17 PROCEDURE — 97110 THERAPEUTIC EXERCISES: CPT

## 2021-11-17 PROCEDURE — 97014 ELECTRIC STIMULATION THERAPY: CPT

## 2021-11-17 NOTE — PROGRESS NOTES
PHYSICAL THERAPY - DAILY TREATMENT NOTE    Patient Name: Jalen Hinkle        Date: 2021  : 1945   yes Patient  Verified  Visit #:     Insurance: Payor: Meryl Gonzales / Plan: [employer] / Product Type: Managed Care Medicare /      In time: 6446 Out time: 3444   Total Treatment Time: 60     Medicare/BCBS Time Tracking (below)   Total Timed Codes (min):  60 1:1 Treatment Time:  60     TREATMENT AREA =  Other low back pain [M54.59]    SUBJECTIVE  Pain Level (on 0 to 10 scale):  4  / 10   Medication Changes/New allergies or changes in medical history, any new surgeries or procedures?    no  If yes, update Summary List   Subjective Functional Status/Changes:  []  No changes reported     When I walk it's like a 6/10 and I had some numbness in my left calf and left foot when I was walking.   I don't feel the SIJ pain unless i'm walking and then it progressively worsens, but then I will stop and rest.         OBJECTIVE  Modalities Rationale:     decrease inflammation and decrease pain to improve patient's ability to perform functional mobility activities and general ADLs with decrease c/o symptoms.    10 min [x] Estim, type/location: (R) SIJ and glute premod                                       []  att     [x]  unatt     []  w/US     [x]  w/ice    []  w/heat    min []  Mechanical Traction: type/lbs                   []  pro   []  sup   []  int   []  cont    []  before manual    []  after manual    min []  Ultrasound, settings/location:      min []  Iontophoresis w/ dexamethasone, location:                                               []  take home patch       []  in clinic    min []  Ice     []  Heat    location/position:     min []  Vasopneumatic Device, press/temp:    If using vaso (only need to measure limb vaso being performed on)      pre-treatment girth :       post-treatment girth :       measured at (landmark location) :      min []  Other:    [x] Skin assessment post-treatment (if applicable):    [x]  intact    []  redness- no adverse reaction                  []redness  adverse reaction:      30 min Therapeutic Exercise:  [x]  See flow sheet   Rationale:      increase ROM, increase strength, improve coordination and improve balance to improve the patients ability to perform functional mobility activities and general ADLs with decrease c/o symptoms.       20 min Manual Therapy: Prone with 2 pillows: DTM L/S and lower T/s paraspinals, DTM (B) SIJ sulcus, (R) upper glute and piriformis, ER ROM/Stretch. Rationale:      decrease pain, increase ROM and increase tissue extensibility to improve patient's ability to perform functional mobility activities and general ADLs with decrease c/o symptoms.    The manual therapy interventions were performed at a separate and distinct time from the therapeutic activities interventions. Billed With/As:   [x] TE   [] TA   [] Neuro   [] Self Care Patient Education: [x] Review HEP    [] Progressed/Changed HEP based on:   [] positioning   [] body mechanics   [] transfers   [] heat/ice application    [] other:      Other Objective/Functional Measures:    = pelvis    vcing for proper technique and form with therx activities. Patient received HEP which was reviewed. Patient returned demonstration and verbalized understanding. Post Treatment Pain Level (on 0 to 10) scale:   0  / 10     ASSESSMENT  Assessment/Changes in Function:     Patient reported no (R) SIJ pain after therapeutic interventions were performed. Patient ambulated in gym without increase of symptoms. Discussed walking program with patient and to limit activity to 3-4 times per week at this time. Also limit to 15 minutes if that is tolerable and without increase of symptoms.      []  See Progress Note/Recertification   Patient will continue to benefit from skilled PT services to modify and progress therapeutic interventions, address functional mobility deficits, address ROM deficits, address strength deficits, analyze and address soft tissue restrictions and analyze and cue movement patterns to attain remaining goals. Progress toward goals / Updated goals:    · Goals for this certification period include and are to be achieved in  4  weeks:  1) Patient will be independent and compliant with HEP in order to progress toward long term goals. 2) Patient will demonstrate the ability to contract the TA without manual cueing and maintain for 10sec hold in order to assist with L-S stabilization. 3) Patient will improve FOTO assessment score to 57 pts in order to indicate improved functional abilities. 4) Patient will report a +6 on GROC in order to aid in QOL. 5) Patient will be able to demonstrate a correct hip hinge without LBP and with proper form in order to decrease pressure on the L-S with lifting and household chores  6) Patient will report being able to ambulate 3 miles without LBP in order to return to a walking program and assist with a return to function.        PLAN  []  Upgrade activities as tolerated yes Continue plan of care   []  Discharge due to :    []  Other:      Therapist: Brian Singh PTA    Date: 11/17/2021 Time: 6:28 AM     Future Appointments   Date Time Provider Marah Ackerman   11/17/2021 11:30 AM MERE Alaniz 3914   11/22/2021 11:30 AM MERE Magana 3914

## 2021-11-22 ENCOUNTER — HOSPITAL ENCOUNTER (OUTPATIENT)
Dept: PHYSICAL THERAPY | Age: 76
Discharge: HOME | End: 2021-11-22
Payer: MEDICARE

## 2021-11-22 PROCEDURE — 97140 MANUAL THERAPY 1/> REGIONS: CPT

## 2021-11-22 PROCEDURE — 97110 THERAPEUTIC EXERCISES: CPT

## 2021-11-22 PROCEDURE — 97014 ELECTRIC STIMULATION THERAPY: CPT

## 2021-11-22 NOTE — PROGRESS NOTES
PHYSICAL THERAPY - DAILY TREATMENT NOTE    Patient Name: Mason De Los Santos        Date: 2021  : 1945   yes Patient  Verified  Visit #:     Insurance: Payor: Vadim Master / Plan: [employer] / Product Type: Managed Care Medicare /      In time: 3925 Out time: 8424   Total Treatment Time: 50     Medicare/BCBS Time Tracking (below)   Total Timed Codes (min):  50 1:1 Treatment Time:  50     TREATMENT AREA =  Other low back pain [M54.59]    SUBJECTIVE  Pain Level (on 0 to 10 scale):  0-4  / 10   Medication Changes/New allergies or changes in medical history, any new surgeries or procedures?    no  If yes, update Summary List   Subjective Functional Status/Changes:  []  No changes reported     No pain when i'm not doing anything, but when i'm walking about a 4/10. I am decreasing the amount of time and speed when i'm walking now.           OBJECTIVE  Modalities Rationale:     decrease inflammation and decrease pain to improve patient's ability to perform functional mobility activities and general ADLs with decrease c/o symptoms.    10 min [x] Estim, type/location: (R) SIJ and glute premod                                                  []  att     [x]  unatt     []  w/US     [x]  w/ice    []  w/heat    min []  Mechanical Traction: type/lbs                   []  pro   []  sup   []  int   []  cont    []  before manual    []  after manual    min []  Ultrasound, settings/location:      min []  Iontophoresis w/ dexamethasone, location:                                               []  take home patch       []  in clinic    min []  Ice     []  Heat    location/position:     min []  Vasopneumatic Device, press/temp:    If using vaso (only need to measure limb vaso being performed on)      pre-treatment girth :       post-treatment girth :       measured at (landmark location) :      min []  Other:    [] Skin assessment post-treatment (if applicable):    []  intact    []  redness- no adverse reaction                  []redness  adverse reaction:      25 min Therapeutic Exercise:  [x]  See flow sheet   Rationale:      increase ROM, increase strength, improve coordination and improve balance to improve the patients ability to perform functional mobility activities and general ADLs with decrease c/o symptoms.       15 min Manual Therapy: Prone with 2 pillows: DTM L/S and lower T/s paraspinals, DTM (B) SIJ sulcus, (R) upper glute and piriformis, ER ROM/Stretch. Rationale:      decrease pain, increase ROM and increase tissue extensibility to improve patient's ability to perform functional mobility activities and general ADLs with decrease c/o symptoms.    The manual therapy interventions were performed at a separate and distinct time from the therapeutic activities interventions. Billed With/As:   [] TE   [] TA   [] Neuro   [] Self Care Patient Education: [x] Review HEP    [] Progressed/Changed HEP based on:   [] positioning   [] body mechanics   [] transfers   [x] heat/ice application    [] other:      Other Objective/Functional Measures:    Add 2# weights and OTB with therx routine for core strengthening and stability. Post Treatment Pain Level (on 0 to 10) scale:   1  / 10     ASSESSMENT  Assessment/Changes in Function:     Patient reporting some reduction of (R) SIJ pain with ambulating activities. Patient maintaining = pelvis x 3 sessions. []  See Progress Note/Recertification   Patient will continue to benefit from skilled PT services to modify and progress therapeutic interventions, address functional mobility deficits, address ROM deficits, address strength deficits, analyze and address soft tissue restrictions and analyze and cue movement patterns to attain remaining goals.    Progress toward goals / Updated goals:    · Goals for this certification period include and are to be achieved in  4  weeks:  1) Patient will be independent and compliant with HEP in order to progress toward long term goals. 2) Patient will demonstrate the ability to contract the TA without manual cueing and maintain for 10sec hold in order to assist with L-S stabilization. Achieved 11/22/21  3) Patient will improve FOTO assessment score to 57 pts in order to indicate improved functional abilities. 4) Patient will report a +6 on GROC in order to aid in QOL. 5) Patient will be able to demonstrate a correct hip hinge without LBP and with proper form in order to decrease pressure on the L-S with lifting and household chores  6) Patient will report being able to ambulate 3 miles without LBP in order to return to a walking program and assist with a return to function.        PLAN  []  Upgrade activities as tolerated yes Continue plan of care   []  Discharge due to :    []  Other:      Therapist: Martina Amador PTA    Date: 11/22/2021 Time: 6:18 AM     Future Appointments   Date Time Provider Marah Ackerman   11/22/2021 11:30 AM Ira Montoya [facility] [address]   12/10/2021  7:45 AM Javed Mas PTA [facility] [address]   12/14/2021  9:45 AM Javed Mas PTA [facility] [address]   12/27/2021 10:00 AM Javed Mas PTA [facility] [address]

## 2021-12-02 ENCOUNTER — HOSPITAL ENCOUNTER (OUTPATIENT)
Dept: PHYSICAL THERAPY | Age: 76
Discharge: HOME | End: 2021-12-02
Payer: MEDICARE

## 2021-12-02 PROCEDURE — 97140 MANUAL THERAPY 1/> REGIONS: CPT

## 2021-12-02 PROCEDURE — 97535 SELF CARE MNGMENT TRAINING: CPT

## 2021-12-02 NOTE — PROGRESS NOTES
[address] [place] [address] PHYSICAL THERAPY  [address] [place] [address] 201,[facility], [facility] - Phone: [phone]  Fax: [phone]   [facility]          Patient Name: Denny Severs : 1945   Medical/Treatment Diagnosis: Other low back pain [M54.59]   Onset Date: 21    Referral Source: Kathy Braxton MD [facility]): 21   Prior Hospitalization: See Medical History Provider #: [id number]   Prior Level of Function: Indep; weightlifting; gardening; able to ambulate 3-5 miles   Comorbidities: L-S fusion; HTN; depression   Medications: Verified on Patient Summary List   Visits from [facility]: 12 Missed Visits: 2     Goal/Measure of Progress Goal Met? 1. Patient will be independent and compliant with HEP in order to progress toward long term goals. Status at last Eval:  Current Status: Received HEP: reviewed with patient and is independent at this time yes   2. Patient will demonstrate the ability to contract the TA without manual cueing and maintain for 10sec hold in order to assist with L-S stabilization. Status at last Eval: unable Current Status: Demonstrates good control while performing dead bugs yes   3. Patient will improve FOTO assessment score to 57 pts in order to indicate improved functional abilities. Status at last Eval: 56 Current Status: 60 yes     4. Patient will report a +6 on GROC in order to aid in QOL. Status at last Eval:  Current Status: +2 a little better progressing   5. Patient will be able to demonstrate a correct hip hinge without LBP and with proper form in order to decrease pressure on the L-S with lifting and household chores   Status at last Eval: unable Current Status: demonstrates with vcing progressing   6. Patient will report being able to ambulate 3 miles without LBP in order to return to a walking program and assist with a return to function.    Status at last Eval: unable Current Status: unable no     Key Functional Changes/Progress: Patient has had good tolerance to therapeutic interventions. Patient reports ~ 65% overall improvement with performance of general ADLs and functional mobility activities. Patient reports max pain 9/10, least pain 2/10, average pain 3/10. Patient is able to negotiate one flight of stairs without UE support and reciprocal pattern. Patient reports standing tolerance: no problem, walking tolerance: 3 minutes (before onset of significant pain), sitting tolerance: no problem. Patient's primary complaint is that she is unable to walk without significant increase of low back pain. Problem List: pain affecting function, decrease ROM, decrease strength, impaired gait/ balance, decrease ADL/ functional abilitiies, decrease activity tolerance, decrease flexibility/ joint mobility and decrease transfer abilities   Treatment Plan may include any combination of the following: Therapeutic exercise, Therapeutic activities, Neuromuscular re-education, Physical agent/modality, Gait/balance training, Manual therapy, Patient education, Self Care training and Functional mobility training  Patient Goal(s) has been updated and includes:      Goals for this certification period include and are to be achieved in   10 visits   Continue with goals 4-6 from above and:  1. Patient will report being able to ambulate 2 blocks without LBP in order to return to a walking program and assist with a return to function. Frequency / Duration:   Patient to be seen   2 times per week for   10 visits    Assessments/Recommendations: continue with POC to address remaining limitations and aid in maximizing  function for a safe return to community integration       If you have any questions/comments please contact us directly at [phone]. Thank you for allowing us to assist in the care of your patient.     Therapist Signature: MARIA L Salcido/Kacey Lawson Richmond, PT, DPT Date: 04/6/2886   Certification Period:  Reporting Period: 10/25/21-1/23/22  10/25/21 - 11/25/21 Time: 3:29 PM   NOTE TO PHYSICIAN:  PLEASE COMPLETE THE ORDERS BELOW AND FAX TO   [facility] Physical Therapy: [phone]  If you are unable to process this request in 24 hours please contact our office: [phone]    ___ I have read the above report and request that my patient continue as recommended.   ___ I have read the above report and request that my patient continue therapy with the following changes/special instructions: ________________________________________________   ___ I have read the above report and request that my patient be discharged from therapy.      Physician Signature:        Date:       Time:      Ramiro Shea MD

## 2021-12-02 NOTE — PROGRESS NOTES
PHYSICAL THERAPY - DAILY TREATMENT NOTE    Patient Name: Dana Dunbar        Date: 2021  : 1945   yes Patient  Verified  Visit #:   15  of   18  Insurance: Payor: Fabiola Hooksy / Plan: [employer] / Product Type: Managed Care Medicare /      In time: 5859 Out time:    Total Treatment Time: 45     Medicare/BCBS Time Tracking (below)   Total Timed Codes (min):  45 1:1 Treatment Time:  45     TREATMENT AREA =  Other low back pain [M54.59]    SUBJECTIVE  Pain Level (on 0 to 10 scale):  3/ 10   Medication Changes/New allergies or changes in medical history, any new surgeries or procedures?    no  If yes, update Summary List   Subjective Functional Status/Changes:  []  No changes reported     If I walked like I did on  my pain was at a nine. And sometimes I just thinks it's because my muscles are just weak. Pain starts out as a heaviness and then a deep ache down my legs. OBJECTIVE  30 min Self care:  [x]  See flow sheet   Rationale:      increase ROM, increase strength, improve coordination and improve balance to improve the patients ability to perform functional mobility activities and general ADLs with decrease c/o symptoms.       15 min Manual Therapy: Prone with 2 pillows: DTM L/S and lower T/s paraspinals, DTM (B) SIJ sulcus, (R) upper glute and piriformis, ER ROM/Stretch. Rationale:      decrease pain, increase ROM and increase tissue extensibility to improve patient's ability to perform functional mobility activities and general ADLs with decrease c/o symptoms.    The manual therapy interventions were performed at a separate and distinct time from the therapeutic activities interventions.       Billed With/As:   [x] TE   [] TA   [] Neuro   [] Self Care Patient Education: [x] Review HEP    [] Progressed/Changed HEP based on:   [] positioning   [] body mechanics   [] transfers   [x] heat/ice application    [] other:      Other Objective/Functional Measures: FOTO: 60    GROC: +2 a little better    Patient reports max pain 9/10, least pain 2/10, average pain 3/10    Patient instructed in return to walking program using distance or time. Patient verbalized understanding. Post Treatment Pain Level (on 0 to 10) scale:   1 / 10     ASSESSMENT  Assessment/Changes in Function:     Patient reports ~ 65% overall improvement with performance of general ADLs and functional mobility activities. Patient reports standing tolerance: no problem, walking tolerance: 3 minutes (before onset of significant pain), sitting tolerance: no problem    Patient is able to negotiate one flight of stairs without UE support and reciprocal pattern.     []  See Progress Note/Recertification   Patient will continue to benefit from skilled PT services to modify and progress therapeutic interventions, address functional mobility deficits, address ROM deficits, address strength deficits, analyze and address soft tissue restrictions and analyze and cue movement patterns to attain remaining goals. Progress toward goals / Updated goals:    · Goals for this certification period include and are to be achieved in  4  weeks:  1) Patient will be independent and compliant with HEP in order to progress toward long term goals. Achieved 12/2/21  2) Patient will demonstrate the ability to contract the TA without manual cueing and maintain for 10sec hold in order to assist with L-S stabilization. Achieved 11/22/21  3) Patient will improve FOTO assessment score to 57 pts in order to indicate improved functional abilities. Achieved 12/2/21  4) Patient will report a +6 on GROC in order to aid in QOL.   5) Patient will be able to demonstrate a correct hip hinge without LBP and with proper form in order to decrease pressure on the L-S with lifting and household chores  6) Patient will report being able to ambulate 3 miles without LBP in order to return to a walking program and assist with a return to function.          PLAN  []  Upgrade activities as tolerated yes Continue plan of care   []  Discharge due to :    []  Other:      Therapist: Lucita Kussmaul, PTA    Date: 12/2/2021 Time: 6:555 AM     Future Appointments   Date Time Provider Marah Ackerman   12/2/2021 11:30 AM Judith Hogan, [facility]   12/10/2021  7:45 AM Judith Hogan, PTA [address] [address]   12/14/2021  9:45 AM Judith Pillar, PTA [address] [address]   12/17/2021  9:15 AM Judith Pillar, PTA [address] [address]   12/27/2021 10:00 AM Judith Pillar, PTA [address] [address]   12/29/2021 10:00 AM Judith Pillar, PTA [address] [address]

## 2021-12-08 ENCOUNTER — APPOINTMENT (OUTPATIENT)
Dept: PHYSICAL THERAPY | Age: 76
End: 2021-12-08
Payer: MEDICARE

## 2021-12-10 ENCOUNTER — APPOINTMENT (OUTPATIENT)
Dept: PHYSICAL THERAPY | Age: 76
End: 2021-12-10
Payer: MEDICARE

## 2021-12-14 ENCOUNTER — HOSPITAL ENCOUNTER (OUTPATIENT)
Dept: PHYSICAL THERAPY | Age: 76
Discharge: HOME | End: 2021-12-14
Payer: MEDICARE

## 2021-12-14 PROCEDURE — 97110 THERAPEUTIC EXERCISES: CPT

## 2021-12-14 PROCEDURE — 97140 MANUAL THERAPY 1/> REGIONS: CPT

## 2021-12-17 ENCOUNTER — HOSPITAL ENCOUNTER (OUTPATIENT)
Dept: PHYSICAL THERAPY | Age: 76
Discharge: HOME | End: 2021-12-17
Payer: MEDICARE

## 2021-12-17 PROCEDURE — 97140 MANUAL THERAPY 1/> REGIONS: CPT

## 2021-12-17 PROCEDURE — 97110 THERAPEUTIC EXERCISES: CPT

## 2021-12-17 NOTE — PROGRESS NOTES
PHYSICAL THERAPY - DAILY TREATMENT NOTE    Patient Name: Tiny Hill        Date: 2021  : 1945   yes Patient  Verified  Visit #:     Insurance: Payor: Shira Marsjose / Plan: [employer] / Product Type: Managed Care Medicare /      In time: 611 Out time: 1000   Total Treatment Time: 45     Medicare/BCBS Time Tracking (below)   Total Timed Codes (min):  45 1:1 Treatment Time:  45     TREATMENT AREA =  Other low back pain [M54.59]    SUBJECTIVE  Pain Level (on 0 to 10 scale):  4/ 10   Medication Changes/New allergies or changes in medical history, any new surgeries or procedures?    no  If yes, update Summary List   Subjective Functional Status/Changes:  []  No changes reported     I have some \"butt knots\" on my right side and my (L) ankle has some pain when I walk. OBJECTIVE  35 min Therapeutic Exercise:  [x]? See flow sheet   Rationale:      increase ROM, increase strength, improve coordination and improve balance to improve the patients ability to perform functional mobility activities and general ADLs with decrease c/o symptoms.         10 min Manual Therapy: Prone : DTM L/S and lower T/s paraspinals, DTM (B) SIJ sulcus, (R) upper glute and piriformis, ER ROM/Stretch. Rationale:      decrease pain, increase ROM and increase tissue extensibility to improve patient's ability to perform functional mobility activities and general ADLs with decrease c/o symptoms.    The manual therapy interventions were performed at a separate and distinct time from the therapeutic activities interventions. Billed With/As:   [x] TE   [] TA   [] Neuro   [] Self Care Patient Education: [x] Review HEP    [] Progressed/Changed HEP based on:   [] positioning   [] body mechanics   [] transfers   [x] heat/ice application    [] other:      Other Objective/Functional Measures:     FOTO: 59     GROC: +4 moderately better     Patient reports least pain 2/10, average pain 3/10       Post Treatment Pain Level (on 0 to 10) scale:   3/ 10     ASSESSMENT  Assessment/Changes in Function:     Patient reports ~ 75% overall improvement with performance of general ADLs and functional mobility activities.     Patient reports standing tolerance: no problem, sitting tolerance: no problem     Patient is able to negotiate one flight of stairs without UE support and reciprocal pattern. Patient reported that she ambulating ~ 10 minutes without significant increase of LBP     []  See Progress Note/Recertification   Patient will continue to benefit from skilled PT services to modify and progress therapeutic interventions, address functional mobility deficits, address ROM deficits, address strength deficits, analyze and address soft tissue restrictions and analyze and cue movement patterns to attain remaining goals. Progress toward goals / Updated goals:    · Goals for this certification period include and are to be achieved in   10 visits     1. Patient will report a +6 on GROC in order to aid in QOL. 2.  Patient will be able to demonstrate a correct hip hinge without LBP and with proper form in order to decrease pressure on the L-S with lifting and household chores   3. Patient will report being able to ambulate 2 blocks without LBP in order to return to a walking program and assist with a return to function.             PLAN  []  Upgrade activities as tolerated yes Continue plan of care   []  Discharge due to :    []  Other:      Therapist: Kareen Reyes PTA    Date: 12/17/2021 Time: 6:555 AM     Future Appointments   Date Time Provider Marah Ackerman   12/17/2021  9:15 AM [facility]   12/21/2021  4:30 PM Warren Scanlon PT [facility] [address]   12/23/2021  8:30 AM Warren Scanlon  [address] [facility]   12/27/2021 10:00 AM Rios Coffey  [address] [facility]   12/29/2021 10:00 AM [facility]

## 2021-12-21 ENCOUNTER — APPOINTMENT (OUTPATIENT)
Dept: PHYSICAL THERAPY | Age: 76
End: 2021-12-21
Payer: MEDICARE

## 2021-12-23 ENCOUNTER — APPOINTMENT (OUTPATIENT)
Dept: PHYSICAL THERAPY | Age: 76
End: 2021-12-23
Payer: MEDICARE

## 2021-12-27 ENCOUNTER — HOSPITAL ENCOUNTER (OUTPATIENT)
Dept: PHYSICAL THERAPY | Age: 76
Discharge: HOME | End: 2021-12-27
Payer: MEDICARE

## 2021-12-27 PROCEDURE — 97110 THERAPEUTIC EXERCISES: CPT

## 2021-12-27 PROCEDURE — 97140 MANUAL THERAPY 1/> REGIONS: CPT

## 2021-12-27 NOTE — PROGRESS NOTES
PHYSICAL THERAPY - DAILY TREATMENT NOTE    Patient Name: Perla Laboy        Date: 2021  : 1945   yes Patient  Verified  Visit #:     Insurance: Payor: Miriammaryanne Reynaga / Plan: [employer] / Product Type: Managed Care Medicare /      In time: 1000 Out time: 1100   Total Treatment Time: 60     Medicare/BCBS Time Tracking (below)   Total Timed Codes (min):  60 1:1 Treatment Time: 60     TREATMENT AREA =  Other low back pain [M54.59]    SUBJECTIVE  Pain Level (on 0 to 10 scale): 4/ 10   Medication Changes/New allergies or changes in medical history, any new surgeries or procedures?    no  If yes, update Summary List   Subjective Functional Status/Changes:  []  No changes reported     I had been doing less activity and exercises, less walking as well. OBJECTIVE    35 min Therapeutic Exercise:  [x]? See flow sheet   Rationale:      increase ROM, increase strength, improve coordination and improve balance to improve the patients ability to perform functional mobility activities and general ADLs with decrease c/o symptoms.         25 min Manual Therapy: Prone : DTM L/S and lower T/s paraspinals, DTM (B) SIJ sulcus, (R) upper glute and piriformis, ER ROM/Stretch. Rationale:      decrease pain, increase ROM and increase tissue extensibility to improve patient's ability to perform functional mobility activities and general ADLs with decrease c/o symptoms.    The manual therapy interventions were performed at a separate and distinct time from the therapeutic activities interventions.       Billed With/As:   [x] TE   [] TA   [] Neuro   [] Self Care Patient Education: [x] Review HEP    [] Progressed/Changed HEP based on:   [] positioning   [] body mechanics   [] transfers   [x] heat/ice application    [] other:      Other Objective/Functional Measures:    Continue with therx for advanced core strengthening and stability          Post Treatment Pain Level (on 0 to 10) scale: 3/ 10     ASSESSMENT  Assessment/Changes in Function:     Decrease overall (B) glute tension although TTP at these regions with DTM. No noticeable difficulties with ambulating in the gym or performing transfers and bed mobility activities. []  See Progress Note/Recertification   Patient will continue to benefit from skilled PT services to modify and progress therapeutic interventions, address functional mobility deficits, address ROM deficits, address strength deficits, analyze and address soft tissue restrictions and analyze and cue movement patterns to attain remaining goals. Progress toward goals / Updated goals:    · Goals for this certification period include and are to be achieved in   10 visits     1. Patient will report a +6 on GROC in order to aid in QOL. 2.  Patient will be able to demonstrate a correct hip hinge without LBP and with proper form in order to decrease pressure on the L-S with lifting and household chores   3. Patient will report being able to ambulate 2 blocks without LBP in order to return to a walking program and assist with a return to function.             PLAN  []  Upgrade activities as tolerated yes Continue plan of care   []  Discharge due to :    []  Other:      Therapist: Maik Prasad PTA    Date: 12/27/2021 Time: 6:555 AM     Future Appointments   Date Time Provider Marah Ackerman   12/27/2021 10:00 AM [facility]   12/29/2021 10:00 AM [facility]

## 2021-12-29 ENCOUNTER — HOSPITAL ENCOUNTER (OUTPATIENT)
Dept: PHYSICAL THERAPY | Age: 76
Discharge: HOME | End: 2021-12-29
Payer: MEDICARE

## 2021-12-29 PROCEDURE — 97110 THERAPEUTIC EXERCISES: CPT

## 2021-12-29 PROCEDURE — 97140 MANUAL THERAPY 1/> REGIONS: CPT

## 2021-12-29 NOTE — PROGRESS NOTES
PHYSICAL THERAPY - DAILY TREATMENT NOTE    Patient Name: Akira Lewis        Date: 2021  : 1945   yes Patient  Verified  Visit #:     Insurance: Payor: Leila Underwood / Plan: [employer] / Product Type: Managed Care Medicare /      In time: 1000 Out time: 1055   Total Treatment Time: 55     Medicare/BCBS Time Tracking (below)   Total Timed Codes (min):  55 1:1 Treatment Time: 55     TREATMENT AREA =  Other low back pain [M54.59]    SUBJECTIVE  Pain Level (on 0 to 10 scale): 3/ 10   Medication Changes/New allergies or changes in medical history, any new surgeries or procedures?    no  If yes, update Summary List   Subjective Functional Status/Changes:  []  No changes reported     No new c/o     OBJECTIVE    30 min Therapeutic Exercise:  [x]? See flow sheet   Rationale:      increase ROM, increase strength, improve coordination and improve balance to improve the patients ability to perform functional mobility activities and general ADLs with decrease c/o symptoms.         25 min Manual Therapy: Prone : DTM L/S and lower T/s paraspinals, DTM (B) SIJ sulcus, (R) upper glute and piriformis, ER ROM/Stretch. Rationale:      decrease pain, increase ROM and increase tissue extensibility to improve patient's ability to perform functional mobility activities and general ADLs with decrease c/o symptoms.    The manual therapy interventions were performed at a separate and distinct time from the therapeutic activities interventions.       Billed With/As:   [x] TE   [] TA   [] Neuro   [] Self Care Patient Education: [x] Review HEP    [] Progressed/Changed HEP based on:   [] positioning   [] body mechanics   [] transfers   [x] heat/ice application    [] other:      Other Objective/Functional Measures:    Performed all therx per flow sheet,  Good tolerance to 7 minute walk on treadmill         Post Treatment Pain Level (on 0 to 10) scale:  3/ 10     ASSESSMENT  Assessment/Changes in Function:     Patient instructed in use of dense ball for self massage/TPR to (B) hips. []  See Progress Note/Recertification      Progress toward goals / Updated goals:    · Goals for this certification period include and are to be achieved in   10 visits     1. Patient will report a +6 on GROC in order to aid in QOL. Progressing   2. Patient will be able to demonstrate a correct hip hinge without LBP and with proper form in order to decrease pressure on the L-S with lifting and household chores: Achieved   3. Patient will report being able to ambulate 2 blocks without LBP in order to return to a walking program and assist with a return to function.             PLAN  []  Upgrade activities as tolerated no Continue plan of care   [x]  Discharge due to : Program completed   []  Other:      Therapist: Renee Gupta PTA    Date: 12/29/2021 Time: 6:555 AM     Future Appointments   Date Time Provider Marah Ackerman   12/29/2021 10:00 AM Filomena Nuno PTA [address] [address]

## 2022-01-11 ENCOUNTER — TELEPHONE (OUTPATIENT)
Dept: PHYSICAL THERAPY | Age: 77
End: 2022-01-11

## 2022-01-11 ENCOUNTER — APPOINTMENT (OUTPATIENT)
Dept: PHYSICAL THERAPY | Age: 77
End: 2022-01-11

## 2022-01-13 ENCOUNTER — APPOINTMENT (OUTPATIENT)
Dept: PHYSICAL THERAPY | Age: 77
End: 2022-01-13

## 2022-01-18 NOTE — PROGRESS NOTES
[address], [address], [address] [place] [address] - Phone: [phone]  Fax: [phone] FOR PHYSICAL THERAPY          Patient Name: Sherman Carr : 1945   Treatment/Medical Diagnosis: Other low back pain [M54.59]   Onset Date: 21    Referral Source: Orly Llanos MD [facility]): 21   Prior Hospitalization: See Medical History Provider #: [id number]   Prior Level of Function: Indep; weightlifting; gardening; able to ambulate 3-5 miles   Comorbidities: L-S fusion; HTN; depression   Medications: Verified on Patient Summary List   [facility] from [facility]: 17 Missed Visits: 2     Goal/Measure of Progress Goal Met? 1. Patient will report a +6 on GROC in order to aid in QOL. Status at last Eval: +2 a little better Current Status: +4 moderately better progressing   2. Patient will be able to demonstrate a correct hip hinge without LBP and with proper form in order to decrease pressure on the L-S with lifting and household chores   Status at last Eval: demonstrates with vcing Current Status: Demonstrates without vcing yes   3. Patient will report being able to ambulate 2 blocks without LBP in order to return to a walking program and assist with a return to function. Status at last Eval: unable Current Status: Achieved yes       Key Functional Changes/Progress: Patient was independent and compliant with HEP and was instructed to return to light workout routine at the gym. Patient was able to perform all light general ADLs and functional mobility activities without significant difficulty or significant increase of pain. Patient was able to ambulated 2 blocks without significant increase of LBP. Patient reported that she has been ambulating ~ 10 minutes without significant increase of LBP. Patient had good tolerance to 7 minute walk on treadmill.   Patient instructed in use of dense ball for self massage/TPR to (B) hips. Patient reports ~ 75% overall improvement with performance of general ADLs and functional mobility activities. Patient reports standing tolerance: no problem, sitting tolerance: no problem. Patient is able to negotiate one flight of stairs without UE support and reciprocal pattern. Patient reports least pain 2/10, average pain 3/10. Assessments/Recommendations: Discontinue therapy. Progressing towards or have reached established goals. If you have any questions/comments please contact us directly at [phone]. Thank you for allowing us to assist in the care of your patient.     Therapist Signature: Raúl Thomas PT, DPT   Date: 1/18/2022   Reporting Period: 11/25/21 - 12/29/21 Time: 8:27 AM

## 2022-01-20 ENCOUNTER — APPOINTMENT (OUTPATIENT)
Dept: PHYSICAL THERAPY | Age: 77
End: 2022-01-20

## 2022-01-24 ENCOUNTER — APPOINTMENT (OUTPATIENT)
Dept: PHYSICAL THERAPY | Age: 77
End: 2022-01-24

## 2022-01-26 ENCOUNTER — APPOINTMENT (OUTPATIENT)
Dept: PHYSICAL THERAPY | Age: 77
End: 2022-01-26

## 2022-01-31 ENCOUNTER — APPOINTMENT (OUTPATIENT)
Dept: PHYSICAL THERAPY | Age: 77
End: 2022-01-31

## 2022-02-02 ENCOUNTER — APPOINTMENT (OUTPATIENT)
Dept: PHYSICAL THERAPY | Age: 77
End: 2022-02-02

## 2022-02-07 ENCOUNTER — APPOINTMENT (OUTPATIENT)
Dept: PHYSICAL THERAPY | Age: 77
End: 2022-02-07

## 2022-02-09 ENCOUNTER — APPOINTMENT (OUTPATIENT)
Dept: PHYSICAL THERAPY | Age: 77
End: 2022-02-09

## 2022-02-11 ENCOUNTER — APPOINTMENT (OUTPATIENT)
Dept: PHYSICAL THERAPY | Age: 77
End: 2022-02-11

## 2022-08-16 ENCOUNTER — HOSPITAL ENCOUNTER (OUTPATIENT)
Dept: PHYSICAL THERAPY | Age: 77
Discharge: HOME | End: 2022-08-16
Payer: MEDICARE

## 2022-08-16 PROCEDURE — 97162 PT EVAL MOD COMPLEX 30 MIN: CPT

## 2022-08-16 NOTE — PROGRESS NOTES
[address], [address], [address] [place] [address]       Phone: [phone]  Fax: [phone] / 6239 [facility]  Patient Name: Meredith Robins : 1945   Medical   Diagnosis: R Hip Pain Treatment Diagnosis: Low back pain [M54.50]   Onset Date: Chronic     Referral Source: [employer] Tammy Start of Care [facility]): 2022   Prior Hospitalization: See medical history Provider #: [id number]   Prior Level of Function: Chronic history of difficulty with transfers, stair negotiation   Comorbidities: Hx of lumbar fusion [id number], HTN, arthritis   Medications: Verified on Patient Summary List   The Plan of Care and following information is based on the information from the initial evaluation.   ===========================================================================================  Assessment / key information:  Patient is a 68year old female presenting to therapy with signs and symptoms consistent with R hip pain. Patient states she has recently been experiencing pain to the R thigh. Patient notes her back and SI joints have been feeling well as she previously has experienced pain to these areas. Patient notes her symptoms seem to be worse when going up and down stairs as well as walking after prolonged sitting. Patient exercises regularly and attempts to stay active. Patient reports increased difficulty with walking after prolonged sitting, stair negotiation. Patient notes symptoms feel better with movement. Patient rates pain at worst 8/10 and 2/10 at best.   Objective Data: Inspection-Fair seated posture. Post op incision present from [id number] consistent with previously surgical procedures. Patient presents with excessive anterior tilting in standing. Lumbar Spine AROM: flex hands to shins, ext 75%, R Rot 75%, L Rot 75%, R SB mid thigh, L SB mid thigh.  Strength Testing: Hip ext R 3+/5, L 3+/5; abd R 3+/5, L 4/5. Flexibility Testing: moderate restriction to B HS, piriformis, R RF. Tenderness to R piriformis, R glute med, R TFL. FOTO: unavailable at this time. Patient educated on diagnosis, prognosis, POC and HEP. Patient issued copy of HEP and denied additional questions. Patient will benefit from skilled PT in order to address these impairments and functional limitations.   ===========================================================================================  Eval Complexity: History HIGH Complexity :3+ comorbidities / personal factors will impact the outcome/ POC ;  Examination  HIGH Complexity : 4+ Standardized tests and measures addressing body structure, function, activity limitation and / or participation in recreation ; Presentation MEDIUM Complexity : Evolving with changing characteristics ; Decision Making Other outcome measures clinical judgement  MEDIUM; Overall Complexity MEDIUM  Problem List: pain affecting function, decrease ROM, decrease strength, decrease ADL/ functional abilitiies, decrease activity tolerance, decrease flexibility/ joint mobility, and decrease transfer abilities   Treatment Plan may include any combination of the following: Therapeutic exercise, Therapeutic activities, Neuromuscular re-education, Physical agent/modality, Gait/balance training, Manual therapy, Patient education, and Functional mobility training  Patient / Family readiness to learn indicated by: asking questions, trying to perform skills and interest  Persons(s) to be included in education: patient (P)  Barriers to Learning/Limitations: no  Measures taken:    Patient Goal (s): Learn exercises to reduce pain   Patient self reported health status: good  Rehabilitation Potential: good  Short Term Goals: To be accomplished in  3  weeks:  Patient will demonstrate independence with HEP for self management of symptoms.   Patient will report reduction in pain at worst to 5-6/10 in order to improve tolerance to stair negotiation. Long Term Goals: To be accomplished in  6  weeks:  Patient will improve FOTO by >/= 10 points in order to improve quality of life. Patient will report reduction in pain at worst to 2-3/10 in order to improve tolerance to recreational activities. Patient will improve R hip strength by 1/2-1 MMT grade in order to improve tolerance to walking activities. Frequency / Duration:   Patient to be seen  2  times per week for 6  weeks:  Patient / Caregiver education and instruction: self care, activity modification, and exercises  G-Codes (GP): wiliam  Therapist Signature: Morris Lane, PT Date: 6/31/0163   Certification Period: 8/16/22-11/10/22 Time: 11:05 AM   ===========================================================================================  I certify that the above Physical Therapy Services are being furnished while the patient is under my care. I agree with the treatment plan and certify that this therapy is necessary. Physician Signature:        Date:       Time:     Please sign and return to [facility] [place] or you may fax the signed copy to [phone]. Thank you.

## 2022-08-16 NOTE — PROGRESS NOTES
PHYSICAL THERAPY - DAILY TREATMENT NOTE    Patient Name: Wade Shirley        Date: 2022  : 1945   yes Patient  Verified  Visit #:     Insurance: Payor: Nadia Arenas / Plan: [employer] / Product Type: Managed Care Medicare /      In time: 2954 Out time: 1050   Total Treatment Time: 30     Medicare/BCBS Time Tracking (below)   Total Timed Codes (min):  0 1:1 Treatment Time:  30     TREATMENT AREA =  Low back pain [M54.50]    SUBJECTIVE  Pain Level (on 0 to 10 scale):  1  / 10   Medication Changes/New allergies or changes in medical history, any new surgeries or procedures?    no  If yes, update Summary List   Subjective Functional Status/Changes:  []  No changes reported     See POC          OBJECTIVE  10 (NB) min Therapeutic Exercise:  [x]  See flow sheet   Rationale:      increase ROM to improve the patients ability to perform transfers. Billed With/As:   [x] TE   [] TA   [] Neuro   [] Self Care Patient Education: [x] Review HEP    [] Progressed/Changed HEP based on:   [] positioning   [] body mechanics   [] transfers   [] heat/ice application    [] other:      Other Objective/Functional Measures:    See POC     Post Treatment Pain Level (on 0 to 10) scale:   1  / 10     ASSESSMENT  Assessment/Changes in Function:     See POC     []  See Progress Note/Recertification   Patient will continue to benefit from skilled PT services to modify and progress therapeutic interventions, address functional mobility deficits, address ROM deficits, address strength deficits, analyze and address soft tissue restrictions, analyze and cue movement patterns, analyze and modify body mechanics/ergonomics, and assess and modify postural abnormalities to attain remaining goals.    Progress toward goals / Updated goals:    See POC     PLAN  [x]  Upgrade activities as tolerated yes Continue plan of care   []  Discharge due to :    []  Other:      Therapist: Perez Julien, PT Date: 8/16/2022 Time: 12:14 PM     No future appointments.

## 2022-09-19 ENCOUNTER — HOSPITAL ENCOUNTER (OUTPATIENT)
Dept: PHYSICAL THERAPY | Age: 77
Discharge: HOME | End: 2022-09-19
Payer: MEDICARE

## 2022-09-19 PROCEDURE — 97535 SELF CARE MNGMENT TRAINING: CPT

## 2022-09-19 PROCEDURE — 97140 MANUAL THERAPY 1/> REGIONS: CPT

## 2022-09-19 PROCEDURE — 97110 THERAPEUTIC EXERCISES: CPT

## 2022-09-19 NOTE — PROGRESS NOTES
PHYSICAL THERAPY - DAILY TREATMENT NOTE    Patient Name: Jesus Frank        Date: 2022  : 1945   yes Patient  Verified  Visit #:      of   12  Insurance: Payor: [place] / Plan: [employer] / Product Type: Managed Care Medicare /      In time: 115 Out time: 153   Total Treatment Time: 38     Medicare/BCBS Time Tracking (below)   Total Timed Codes (min):  38 1:1 Treatment Time:  38     TREATMENT AREA =  Low back pain [M54.50]    SUBJECTIVE  Pain Level (on 0 to 10 scale):  5  / 10   Medication Changes/New allergies or changes in medical history, any new surgeries or procedures?    no  If yes, update Summary List   Subjective Functional Status/Changes:  []  No changes reported     I think I have some hip flexor tightness. OBJECTIVE  Modalities Rationale:     decrease inflammation, decrease pain, and increase tissue extensibility to improve patient's ability to perform functional mobility activities with decrease c/o symptoms.    min [] Estim, type/location:                                      []  att     []  unatt     []  w/US     []  w/ice    []  w/heat    min []  Mechanical Traction: type/lbs                   []  pro   []  sup   []  int   []  cont    []  before manual    []  after manual    min []  Ultrasound, settings/location:      min []  Iontophoresis w/ dexamethasone, location:                                               []  take home patch       []  in clinic    min []  Ice     []  Heat    location/position:     min []  Vasopneumatic Device, press/temp:    If using vaso (only need to measure limb vaso being performed on)      pre-treatment girth :       post-treatment girth :       measured at (landmark location) :      min []  Other:    [x] Skin assessment post-treatment (if applicable):    [x]  intact    []  redness- no adverse reaction                  []redness - adverse reaction:      15 min Therapeutic Exercise:  [x]  See flow sheet   Rationale: increase ROM, increase strength, improve coordination, and improve balance to improve the patients ability to perform functional mobility activities with decrease c/o symptoms. 15 min Manual Therapy: MFR (R) iliopsoas, TFL,hip flexor stretch. Rationale:      decrease pain, increase ROM, and increase tissue extensibility to improve patient's ability to perform functional mobility activities with decrease c/o symptoms. The manual therapy interventions were performed at a separate and distinct time from the therapeutic activities interventions. min Therapeutic Activity: [x]  See flow sheet   Rationale:    increase ROM, increase strength, and improve coordination to improve the patients ability to perform functional mobility activities with decrease c/o symptoms. min Neuromuscular Re-ed: [x]  See flow sheet   Rationale:    increase ROM, increase strength, improve coordination, and improve balance to improve the patients ability to perform functional mobility activities with decrease c/o symptoms. 8 min Self Care: Patient education of HEP and related symptoms of iliopsoas syndrome and TFL tightness. Rationale:    increase ROM, increase strength, and improve coordination to improve the patients ability to perform functional mobility activities with decrease c/o symptoms.     Billed With/As:   [x] TE   [] TA   [] Neuro   [] Self Care Patient Education: [x] Review HEP    [] Progressed/Changed HEP based on:   [] positioning   [] body mechanics   [] transfers   [] heat/ice application    [] other:      Other Objective/Functional Measures:    No change in functional measurements today     Post Treatment Pain Level (on 0 to 10) scale:   6  / 10     ASSESSMENT  Assessment/Changes in Function:     Overall good tolerance to all therapeutic interventions for first treatment session     []  See Progress Note/Recertification   Patient will continue to benefit from skilled PT services to modify and progress therapeutic interventions, address functional mobility deficits, address ROM deficits, address strength deficits, analyze and address soft tissue restrictions, and analyze and cue movement patterns to attain remaining goals. Progress toward goals / Updated goals:    Short Term Goals: To be accomplished in  3  weeks:  Patient will demonstrate independence with HEP for self management of symptoms. Patient will report reduction in pain at worst to 5-6/10 in order to improve tolerance to stair negotiation. Long Term Goals: To be accomplished in  6  weeks:  Patient will improve FOTO by >/= 10 points in order to improve quality of life. Patient will report reduction in pain at worst to 2-3/10 in order to improve tolerance to recreational activities. Patient will improve R hip strength by 1/2-1 MMT grade in order to improve tolerance to walking activities.         PLAN  []  Upgrade activities as tolerated yes Continue plan of care   []  Discharge due to :    []  Other:      Therapist: Lin La PTA    Date: 9/19/2022 Time: 6:08 AM     Future Appointments   Date Time Provider Marah Ackerman   9/19/2022  1:15 PM Benny Seo PTA [facility] [id number]

## 2022-09-19 NOTE — PROGRESS NOTES
[address], [facility] [address],[facility], [address] [facility] - Phone: [phone]  Fax: [phone]   PROGRESS NOTE    Patient Name: Alexa Lara : 1945   Treatment/Medical Diagnosis: Low back pain [M54.50]     Referral Source: Allen Bingham     Date of Initial Visit: 2022 Attended Visits: 2 Missed Visits: 0     SUMMARY OF TREATMENT  Alexa Lara has been seen at our clinic for a total of 0 visits. Pt treatment has consisted of aerobic warm-up with treadmill, therapeutic exercise for lumbar ROM, hip/core strengthening, modalities prn and manual therapy (manual stretch of hamstring)    CURRENT STATUS  Pt has had a good tolerance to physical therapy treatment. Patient has been out of town for 1 month secondary to family related issues. Patient returned on 22 for first treatment session. No changes have been noted at this time toward goals. Short Term Goals: To be accomplished in  3  weeks:  Patient will demonstrate independence with HEP for self management of symptoms. Patient will report reduction in pain at worst to 5-6/10 in order to improve tolerance to stair negotiation. Long Term Goals: To be accomplished in  6  weeks:  Patient will improve FOTO by >/= 10 points in order to improve quality of life. Patient will report reduction in pain at worst to 2-3/10 in order to improve tolerance to recreational activities. Patient will improve R hip strength by 1/2-1 MMT grade in order to improve tolerance to walking activities    New Goals to be achieved in __4__  weeks:  1. Continue with STGs and LTGs above. RECOMMENDATIONS  We would like to continue treatment 2x/wk for 4weeks to progress patient further toward goals. Please advise. Thank you. If you have any questions/comments please contact us directly at [phone]. Thank you for allowing us to assist in the care of your patient.     LPTA Signature: Allison Hernandez, PTA Date: 9/19/2022   PT Signature:  Time: 6:27 AM     NOTE TO PHYSICIAN:  PLEASE COMPLETE THE ORDERS BELOW AND FAX TO   [facility], [place] Physical Therapy: [phone]  If you are unable to process this request in 24 hours please contact our office: [phone]    ___ I have read the above report and request that my patient continue as recommended.   ___ I have read the above report and request that my patient continue therapy with the following changes/special instructions:_________________________________________________________   ___ I have read the above report and request that my patient be discharged from therapy.      Physician Signature:        Date:       Time:      Selin Rashid PA-C

## 2022-09-29 ENCOUNTER — APPOINTMENT (OUTPATIENT)
Dept: PHYSICAL THERAPY | Age: 77
End: 2022-09-29
Payer: MEDICARE

## 2022-09-30 ENCOUNTER — APPOINTMENT (OUTPATIENT)
Dept: PHYSICAL THERAPY | Age: 77
End: 2022-09-30
Payer: MEDICARE

## 2022-09-30 ENCOUNTER — TELEPHONE (OUTPATIENT)
Dept: PHYSICAL THERAPY | Age: 77
End: 2022-09-30

## 2022-10-03 ENCOUNTER — APPOINTMENT (OUTPATIENT)
Dept: PHYSICAL THERAPY | Age: 77
End: 2022-10-03

## 2022-10-05 ENCOUNTER — APPOINTMENT (OUTPATIENT)
Dept: PHYSICAL THERAPY | Age: 77
End: 2022-10-05

## 2022-10-06 ENCOUNTER — APPOINTMENT (OUTPATIENT)
Dept: PHYSICAL THERAPY | Age: 77
End: 2022-10-06

## 2022-10-11 ENCOUNTER — APPOINTMENT (OUTPATIENT)
Dept: PHYSICAL THERAPY | Age: 77
End: 2022-10-11

## 2022-10-11 NOTE — THERAPY DISCHARGE
[address], [facility] [address],[facility], [address] [facility] - Phone: [phone]  Fax: [phone]   DISCHARGE NOTE    Patient Name: Lizbet Barajas : 1945   Treatment/Medical Diagnosis: Low back pain [M54.50]     Referral Source: Kash Panchal     Date of Initial Visit: 2022 Attended Visits: 2 Missed Visits: 0     SUMMARY OF TREATMENT  Lizbet Barajas has been seen at our clinic for a total of 2 visits. Pt treatment has consisted of aerobic warm-up with treadmill, therapeutic exercise for lumbar ROM, hip/core strengthening, modalities prn and manual therapy. CURRENT STATUS  Pt had only attend one initial eval and one follow up appointment. No formal reassessment toward goals had been made due to self discharge. No goals achieved. Short Term Goals: To be accomplished in  3  weeks:  Patient will demonstrate independence with HEP for self management of symptoms. Patient will report reduction in pain at worst to 5-6/10 in order to improve tolerance to stair negotiation. Long Term Goals: To be accomplished in  6  weeks:  Patient will improve FOTO by >/= 10 points in order to improve quality of life. Patient will report reduction in pain at worst to 2-3/10 in order to improve tolerance to recreational activities. Patient will improve R hip strength by 1/2-1 MMT grade in order to improve tolerance to walking activities        RECOMMENDATIONS  Patient called and self discharged. If you have any questions/comments please contact us directly at [phone]. Thank you for allowing us to assist in the care of your patient.     LPTA Signature: Brian Singh PTA Date: 10/11/2022   PT Signature:  Time: 6:27 AM     NOTE TO PHYSICIAN:  PLEASE COMPLETE THE ORDERS BELOW AND FAX TO   [facility]: [phone]  If you are unable to process this request in 24 hours please contact our office: [phone]    ___ I have read the above report and request that my patient continue as recommended.   ___ I have read the above report and request that my patient continue therapy with the following changes/special instructions:_________________________________________________________   ___ I have read the above report and request that my patient be discharged from therapy.      Physician Signature:        Date:       Time:      Herminia Byrd PA-C

## 2022-10-13 ENCOUNTER — APPOINTMENT (OUTPATIENT)
Dept: PHYSICAL THERAPY | Age: 77
End: 2022-10-13

## 2022-10-17 ENCOUNTER — APPOINTMENT (OUTPATIENT)
Dept: PHYSICAL THERAPY | Age: 77
End: 2022-10-17

## 2022-10-19 ENCOUNTER — APPOINTMENT (OUTPATIENT)
Dept: PHYSICAL THERAPY | Age: 77
End: 2022-10-19

## 2022-10-25 ENCOUNTER — APPOINTMENT (OUTPATIENT)
Dept: PHYSICAL THERAPY | Age: 77
End: 2022-10-25

## 2022-10-28 ENCOUNTER — APPOINTMENT (OUTPATIENT)
Dept: PHYSICAL THERAPY | Age: 77
End: 2022-10-28